# Patient Record
Sex: MALE | Race: BLACK OR AFRICAN AMERICAN | NOT HISPANIC OR LATINO
[De-identification: names, ages, dates, MRNs, and addresses within clinical notes are randomized per-mention and may not be internally consistent; named-entity substitution may affect disease eponyms.]

---

## 2022-03-01 PROBLEM — Z00.00 ENCOUNTER FOR PREVENTIVE HEALTH EXAMINATION: Status: ACTIVE | Noted: 2022-03-01

## 2022-03-03 ENCOUNTER — APPOINTMENT (OUTPATIENT)
Dept: INTERNAL MEDICINE | Facility: CLINIC | Age: 67
End: 2022-03-03
Payer: MEDICARE

## 2022-03-03 ENCOUNTER — RESULT REVIEW (OUTPATIENT)
Age: 67
End: 2022-03-03

## 2022-03-03 VITALS
DIASTOLIC BLOOD PRESSURE: 82 MMHG | HEIGHT: 70 IN | BODY MASS INDEX: 24.77 KG/M2 | WEIGHT: 173 LBS | OXYGEN SATURATION: 97 % | TEMPERATURE: 97.2 F | SYSTOLIC BLOOD PRESSURE: 122 MMHG | HEART RATE: 96 BPM

## 2022-03-03 DIAGNOSIS — Z12.2 ENCOUNTER FOR SCREENING FOR MALIGNANT NEOPLASM OF RESPIRATORY ORGANS: ICD-10-CM

## 2022-03-03 DIAGNOSIS — Z23 ENCOUNTER FOR IMMUNIZATION: ICD-10-CM

## 2022-03-03 DIAGNOSIS — Z01.00 ENCOUNTER FOR EXAMINATION OF EYES AND VISION W/OUT ABNORMAL FINDINGS: ICD-10-CM

## 2022-03-03 PROCEDURE — 99203 OFFICE O/P NEW LOW 30 MIN: CPT | Mod: 25

## 2022-03-03 PROCEDURE — G0442 ANNUAL ALCOHOL SCREEN 15 MIN: CPT

## 2022-03-03 RX ORDER — LATANOPROST/PF 0.005 %
0.01 DROPS OPHTHALMIC (EYE)
Refills: 0 | Status: ACTIVE | COMMUNITY

## 2022-03-03 NOTE — ASSESSMENT
[FreeTextEntry1] : 67 y/o man, new to me, with large L inguinal hernia. Needs general surgery consult ASAP. CT pelvis ordered.\par Dicussed lung cancer screening but would like to defer order for now.\par Defers vaccines (flu, PNA).\par Defers CPE-will likely need preop.\par While patient does consume more than RDD, he doesn't appear to abuse EtOH. 
no

## 2022-03-03 NOTE — PHYSICAL EXAM
[No Edema] : there was no peripheral edema [Normal] : normal gait, coordination grossly intact, no focal deficits and deep tendon reflexes were 2+ and symmetric [Normal Affect] : the affect was normal [Alert and Oriented x3] : oriented to person, place, and time [de-identified] : large non-reducible inguinal mass

## 2022-03-03 NOTE — REVIEW OF SYSTEMS
[Abdominal Pain] : no abdominal pain [Nausea] : no nausea [Constipation] : no constipation [Diarrhea] : no diarrhea [Vomiting] : no vomiting [Heartburn] : no heartburn [Melena] : no melena [Negative] : Heme/Lymph [FreeTextEntry7] : large inguinal mass

## 2022-03-03 NOTE — HEALTH RISK ASSESSMENT
[Good] : ~his/her~  mood as  good [Current] : Current [20 or more] : 20 or more [Yes] : Yes [4 or more  times a week (4 pts)] : 4 or more  times a week (4 points) [1 or 2 (0 pts)] : 1 or 2 (0 points) [Never (0 pts)] : Never (0 points) [No] : In the past 12 months have you used drugs other than those required for medical reasons? No [No falls in past year] : Patient reported no falls in the past year [0] : 2) Feeling down, depressed, or hopeless: Not at all (0) [PHQ-2 Negative - No further assessment needed] : PHQ-2 Negative - No further assessment needed [Audit-CScore] : 4 [[id number]] : 0

## 2022-03-03 NOTE — HISTORY OF PRESENT ILLNESS
[de-identified] : 67 y/o man is here to establish care and "deal with hernia before it deals with me". \par Several years ago, noted L groin pain during workout (preCOVID). Now, it's large and "scary." \par No change in BM, no n/v.\par Always present x 1 year. H/o hernia in the past (umbilical). No pain - just "uncomfortable".\par \par Hasn't been to doctor in "years" (besides ophtho).\par +tobacco dependence-never had lung cancer screen\par ~14 dirnks/week\par \par MODERNA x 3.\par \par Declines dealing with HCM/CPE today but promises to return to discuss vaccines, HCM, labs, EKG, etc (will also likely need preop).

## 2022-03-14 ENCOUNTER — OUTPATIENT (OUTPATIENT)
Dept: OUTPATIENT SERVICES | Facility: HOSPITAL | Age: 67
LOS: 1 days | End: 2022-03-14

## 2022-03-14 ENCOUNTER — APPOINTMENT (OUTPATIENT)
Dept: CT IMAGING | Facility: CLINIC | Age: 67
End: 2022-03-14
Payer: MEDICARE

## 2022-03-14 PROCEDURE — 72193 CT PELVIS W/DYE: CPT | Mod: 26

## 2022-03-20 ENCOUNTER — NON-APPOINTMENT (OUTPATIENT)
Age: 67
End: 2022-03-20

## 2022-03-21 ENCOUNTER — APPOINTMENT (OUTPATIENT)
Dept: SURGERY | Facility: CLINIC | Age: 67
End: 2022-03-21
Payer: MEDICARE

## 2022-03-21 ENCOUNTER — TRANSCRIPTION ENCOUNTER (OUTPATIENT)
Age: 67
End: 2022-03-21

## 2022-03-21 ENCOUNTER — APPOINTMENT (OUTPATIENT)
Dept: UROLOGY | Facility: CLINIC | Age: 67
End: 2022-03-21
Payer: MEDICARE

## 2022-03-21 VITALS
TEMPERATURE: 97.8 F | HEART RATE: 80 BPM | DIASTOLIC BLOOD PRESSURE: 79 MMHG | HEIGHT: 70 IN | BODY MASS INDEX: 25.2 KG/M2 | SYSTOLIC BLOOD PRESSURE: 153 MMHG | WEIGHT: 176 LBS

## 2022-03-21 VITALS
TEMPERATURE: 97.3 F | SYSTOLIC BLOOD PRESSURE: 159 MMHG | HEART RATE: 80 BPM | HEIGHT: 70 IN | WEIGHT: 178.38 LBS | BODY MASS INDEX: 25.54 KG/M2 | DIASTOLIC BLOOD PRESSURE: 76 MMHG | OXYGEN SATURATION: 97 %

## 2022-03-21 PROCEDURE — 51798 US URINE CAPACITY MEASURE: CPT

## 2022-03-21 PROCEDURE — 99204 OFFICE O/P NEW MOD 45 MIN: CPT | Mod: 25

## 2022-03-21 PROCEDURE — 99204 OFFICE O/P NEW MOD 45 MIN: CPT

## 2022-03-21 NOTE — PHYSICAL EXAM
[General Appearance - Well Developed] : well developed [General Appearance - Well Nourished] : well nourished [Normal Appearance] : normal appearance [Well Groomed] : well groomed [General Appearance - In No Acute Distress] : no acute distress [Edema] : no peripheral edema [Respiration, Rhythm And Depth] : normal respiratory rhythm and effort [Exaggerated Use Of Accessory Muscles For Inspiration] : no accessory muscle use [Abdomen Soft] : soft [Abdomen Tenderness] : non-tender [Urethral Meatus] : meatus normal [Urinary Bladder Findings] : the bladder was normal on palpation [Testes Mass (___cm)] : there were no testicular masses [Scrotum] : the scrotum was normal [No Prostate Nodules] : no prostate nodules [Normal Station and Gait] : the gait and station were normal for the patient's age [] : no rash [No Focal Deficits] : no focal deficits [Oriented To Time, Place, And Person] : oriented to person, place, and time [Affect] : the affect was normal [Mood] : the mood was normal [Not Anxious] : not anxious [No Palpable Adenopathy] : no palpable adenopathy [FreeTextEntry1] : large L inguinal hernia, prostate enlarged on MIKEY

## 2022-03-21 NOTE — CONSULT LETTER
[FreeTextEntry1] : 2022\par \par \par \par Ashley Bah M.D.\par [facility] at [address] \par [address]\[place] [address]\par Telephone #:  [phone]\par \par \par Re: Isaac Brooks\par : 1955\par \par \par Dear Dr. Bah:\par \par I had the opportunity to see Mr. Brooks today for evaluation and management of a left inguinal hernia.  He stated the hernia has been present for approximately 3 years.  He first noticed the hernia as a bulge.  He denied significant pain of the hernia, although it is uncomfortable.  He stated the hernia is enlarging over the last year.  He wears a truss with relief of the bulge.\par \par On physical examination, his height is 5 feet 10 inches, his weight is 178 pounds, and BMI is 25.59. His temperature is 97.3 °F, his blood pressure is 159/76, heart rate is 80, and O2 saturation is 97% on room air.  In general, he is a well-dressed, well-nourished man who appears his stated age and is in no acute distress.  He is calm, alert and oriented x 3.  HEENT exam demonstrates no scleral icterus and a normocephalic atraumatic appearance.  His abdomen has audible bowel sounds, is soft, non-tender, and non-distended.  There is no hepatosplenomegaly.  There is a small recurrent umbilical hernia that is reducible and non-tender.  His extremities are warm and dry with no evidence of clubbing, cyanosis, or edema.  Bilateral groin examination demonstrated bilateral reducible inguinal hernias, left much larger than right.\par \par I reviewed the images and report of the CT pelvis that was performed on 2022, which demonstrated very severe prostatic enlargement.  Moderate size left inguinal hernia containing non-obstructed small bowel.\par \par In summary, Mr. Brooks is a 66-year-old man with bilateral inguinal hernias and a recurrent umbilical hernia.  We will plan for a robotic-assisted bilateral inguinal hernia repair with mesh and an umbilical hernia repair with possible at the patient's convenience.  His severely enlarged prostate should be addressed prior to the hernia repair to attempt to avoid urinary retention post-operatively.\par \par Thank you for the opportunity to care for this patient.  Please do not hesitate to contact me in the event that you have any questions or concerns regarding the care of this patient. \par \par Sincerely,\par \par \par \par \par Katy Montgomery M.D.

## 2022-03-21 NOTE — HISTORY OF PRESENT ILLNESS
[Urinary Frequency] : urinary frequency [Nocturia] : nocturia [Weak Stream] : weak stream [FreeTextEntry1] : Mr. BRO is a 66 year old M with PMHx of glaucoma presenting for enlarged prostate.\par Patient was recently being evaluated for large inguinal hernia. \par He underwent CT pelvis 3/14/2022 which demonstrates markedly large prostate with impingement on posterior wall of bladder. \par Today the patient reports feeling well. \par He does have mild urinary frequency, nocturia x1, weak stream.\par  These symptoms are not bothersome to him. \par Denies dyuria, hematuria. \par \par Social history; 50 pack year smoker, 2 beers nightly \par Family history: unknown\par Medications: denies. \par Allergies: NKA\par \par PSA trends\par Last PSA 10+ year ago, elevated. \par He did have a prior prostate biopsy 10 + years ago which he reports was neg.

## 2022-03-21 NOTE — DATA REVIEWED
[FreeTextEntry1] : CT pelvis (3/14/2022) - very severe prostatic enlargement.  Moderate size left inguinal hernia containing non-obstructed small bowel.

## 2022-03-21 NOTE — PHYSICAL EXAM
[Calm] : calm [de-identified] : NAD, comfortable [de-identified] : NCAT, no scleral icterus [de-identified] : +BS soft NT ND.  No hepatosplenomegaly. [de-identified] : Bilateral reducible inguinal hernias, L > R. [de-identified] : No clubbing, cyanosis, or edema. [de-identified] : Warm, dry. [de-identified] : A&Ox3

## 2022-03-21 NOTE — HISTORY OF PRESENT ILLNESS
[de-identified] : Mr. Brooks presented today for evaluation and management of a left inguinal hernia.  He stated the hernia has been present for approximately 3 years.  He first noticed the hernia as a bulge.  He denied significant pain of the hernia, although it is uncomfortable.  He stated the hernia is enlarging over the last year.  He wears a truss with relief of the bulge.

## 2022-03-21 NOTE — END OF VISIT
[FreeTextEntry3] : I, Dr. Arauz, personally performed the evaluation and management (E/M) services for this new patient who presents today with (a) new problem(s)/exacerbation of (an) existing condition(s).  That E/M includes conducting the examination, assessing all new/exacerbated conditions, and establishing a new plan of care.  Today, my ACP, Franca Wild, was here to observe my evaluation and management services for this new problem/exacerbated condition to be followed going forward.\par

## 2022-03-21 NOTE — ASSESSMENT
[FreeTextEntry1] : 67 y/o M with inguinal hernia, significantly enlarged prostate, LUTS. \par \par BPH/LUTS\par Reviewed imaging, very enlarged prostate, likely > 200gm\par discussed impact of prostate size on urinary symptoms. \par Luts not bothersome at this time.\par PVR = 0cc\par \par Discussed importance of prostate cancer screening.\par Enlarged prostate on MIKEY today, neg for nodules. \par History of elevated PSA with negative biopsy. PSA today. Will likely be elevated given size of prostate on CT. \par MRI prostate discussed/ordered for further evaluation. \par \par Inguinal hernia \par Cont. f/u with general surgery \par Okay to proceed with procedure if MRI normal. \par

## 2022-03-21 NOTE — ASSESSMENT
[FreeTextEntry1] : Mr. Brooks is a 66-year-old man with bilateral inguinal hernias and a recurrent umbilical hernia.  We will plan for a robotic-assisted bilateral inguinal hernia repair with mesh and an umbilical hernia repair with possible at the patient's convenience.  His severely enlarged prostate should be addressed prior to the hernia repair to attempt to avoid urinary retention post-operatively.

## 2022-03-22 LAB — PSA SERPL-MCNC: 12.3 NG/ML

## 2022-03-23 ENCOUNTER — TRANSCRIPTION ENCOUNTER (OUTPATIENT)
Age: 67
End: 2022-03-23

## 2022-04-15 NOTE — REVIEW OF SYSTEMS
----- Message from Fredy Reddy MD sent at 4/14/2022 11:43 PM CDT -----  His kidney function is low, but improved over last time.  His blood sugar is borderline high. Avoid concentrated sweets. Other labs were ok.    [Negative] : Heme/Lymph

## 2022-04-18 ENCOUNTER — APPOINTMENT (OUTPATIENT)
Dept: MRI IMAGING | Facility: HOSPITAL | Age: 67
End: 2022-04-18

## 2022-04-18 ENCOUNTER — OUTPATIENT (OUTPATIENT)
Dept: OUTPATIENT SERVICES | Facility: HOSPITAL | Age: 67
LOS: 1 days | End: 2022-04-18
Payer: MEDICARE

## 2022-04-18 PROCEDURE — A9585: CPT

## 2022-04-18 PROCEDURE — 72197 MRI PELVIS W/O & W/DYE: CPT

## 2022-04-18 PROCEDURE — 72197 MRI PELVIS W/O & W/DYE: CPT | Mod: 26

## 2022-04-21 ENCOUNTER — NON-APPOINTMENT (OUTPATIENT)
Age: 67
End: 2022-04-21

## 2022-05-09 ENCOUNTER — APPOINTMENT (OUTPATIENT)
Dept: SURGERY | Facility: CLINIC | Age: 67
End: 2022-05-09
Payer: MEDICARE

## 2022-05-09 VITALS
BODY MASS INDEX: 25.05 KG/M2 | DIASTOLIC BLOOD PRESSURE: 83 MMHG | HEIGHT: 70 IN | HEART RATE: 88 BPM | SYSTOLIC BLOOD PRESSURE: 150 MMHG | OXYGEN SATURATION: 98 % | TEMPERATURE: 96.7 F | WEIGHT: 175 LBS

## 2022-05-09 PROCEDURE — 99213 OFFICE O/P EST LOW 20 MIN: CPT

## 2022-05-09 NOTE — PHYSICAL EXAM
[Calm] : calm [de-identified] : NAD, comfortable [de-identified] : NCAT, no scleral icterus [de-identified] : +BS soft NT ND.  No hepatosplenomegaly.  Recurrent umbilical hernia, reducible and non-tender. [de-identified] : Bilateral reducible inguinal hernias, L > R. [de-identified] : No clubbing, cyanosis, or edema. [de-identified] : Warm, dry. [de-identified] : A&Ox3

## 2022-05-09 NOTE — HISTORY OF PRESENT ILLNESS
[de-identified] : Mr. Brooks presented previously for evaluation and management of a left inguinal hernia.  He stated the hernia has been present for approximately 3 years.  He first noticed the hernia as a bulge.  He denied significant pain of the hernia, although it is uncomfortable.  He stated the hernia is enlarging over the last year.  He wears a truss with relief of the bulge. [de-identified] : He presented today for follow up.  He was seen by urology and "cleared" for hernia repair.  He stated the hernia has continued to enlarge since his last visit.  He noted nocturia 2-3 times a night currently.

## 2022-05-09 NOTE — ASSESSMENT
[FreeTextEntry1] : Mr. Brooks is a 66-year-old man with bilateral inguinal hernias and a recurrent umbilical hernia.  We will plan for a robotic-assisted bilateral inguinal hernia repair with mesh and a recurrent umbilical hernia repair with mesh on July 19, 2022.  He must quit smoking 8 weeks prior to surgery, and will need urine cotinine testing 2 weeks prior to the scheduled surgery date.  I will discuss with urology his severely enlarged prostate and whether this should be addressed prior to the hernia repair to attempt to avoid urinary retention post-operatively.

## 2022-07-05 LAB
ANABASINE UR-MCNC: <2 NG/ML
COTININE UR-MCNC: <5 NG/ML
COTININE UR-MCNC: <5 NG/ML
NORNICOTINE UR-MCNC: <2 NG/ML

## 2022-07-07 ENCOUNTER — OUTPATIENT (OUTPATIENT)
Dept: OUTPATIENT SERVICES | Facility: HOSPITAL | Age: 67
LOS: 1 days | End: 2022-07-07
Payer: MEDICARE

## 2022-07-07 ENCOUNTER — NON-APPOINTMENT (OUTPATIENT)
Age: 67
End: 2022-07-07

## 2022-07-07 ENCOUNTER — APPOINTMENT (OUTPATIENT)
Dept: INTERNAL MEDICINE | Facility: CLINIC | Age: 67
End: 2022-07-07

## 2022-07-07 ENCOUNTER — RESULT REVIEW (OUTPATIENT)
Age: 67
End: 2022-07-07

## 2022-07-07 VITALS
OXYGEN SATURATION: 97 % | TEMPERATURE: 97.3 F | HEIGHT: 70 IN | WEIGHT: 179 LBS | BODY MASS INDEX: 25.62 KG/M2 | DIASTOLIC BLOOD PRESSURE: 86 MMHG | HEART RATE: 79 BPM | SYSTOLIC BLOOD PRESSURE: 140 MMHG

## 2022-07-07 PROCEDURE — 71046 X-RAY EXAM CHEST 2 VIEWS: CPT

## 2022-07-07 PROCEDURE — 93000 ELECTROCARDIOGRAM COMPLETE: CPT

## 2022-07-07 PROCEDURE — 71046 X-RAY EXAM CHEST 2 VIEWS: CPT | Mod: 26

## 2022-07-07 PROCEDURE — 99214 OFFICE O/P EST MOD 30 MIN: CPT | Mod: 25

## 2022-07-07 PROCEDURE — 36415 COLL VENOUS BLD VENIPUNCTURE: CPT

## 2022-07-07 RX ORDER — DORZOLAMIDE HYDROCHLORIDE TIMOLOL MALEATE 20; 5 MG/ML; MG/ML
22.3-6.8 SOLUTION/ DROPS OPHTHALMIC
Qty: 10 | Refills: 0 | Status: ACTIVE | COMMUNITY
Start: 2022-06-15

## 2022-07-07 NOTE — PHYSICAL EXAM
[No Acute Distress] : no acute distress [Well Nourished] : well nourished [Well Developed] : well developed [Well-Appearing] : well-appearing [Normal Sclera/Conjunctiva] : normal sclera/conjunctiva [PERRL] : pupils equal round and reactive to light [EOMI] : extraocular movements intact [Normal Outer Ear/Nose] : the outer ears and nose were normal in appearance [Normal Oropharynx] : the oropharynx was normal [No JVD] : no jugular venous distention [No Lymphadenopathy] : no lymphadenopathy [Supple] : supple [Thyroid Normal, No Nodules] : the thyroid was normal and there were no nodules present [No Respiratory Distress] : no respiratory distress  [No Accessory Muscle Use] : no accessory muscle use [Clear to Auscultation] : lungs were clear to auscultation bilaterally [Normal Rate] : normal rate  [Regular Rhythm] : with a regular rhythm [Normal S1, S2] : normal S1 and S2 [No Murmur] : no murmur heard [No Carotid Bruits] : no carotid bruits [No Abdominal Bruit] : a ~M bruit was not heard ~T in the abdomen [No Varicosities] : no varicosities [Pedal Pulses Present] : the pedal pulses are present [No Edema] : there was no peripheral edema [No Palpable Aorta] : no palpable aorta [No Extremity Clubbing/Cyanosis] : no extremity clubbing/cyanosis [Soft] : abdomen soft [Non Tender] : non-tender [Non-distended] : non-distended [No Masses] : no abdominal mass palpated [No HSM] : no HSM [Normal Bowel Sounds] : normal bowel sounds [Normal Posterior Cervical Nodes] : no posterior cervical lymphadenopathy [Normal Anterior Cervical Nodes] : no anterior cervical lymphadenopathy [No CVA Tenderness] : no CVA  tenderness [No Spinal Tenderness] : no spinal tenderness [No Joint Swelling] : no joint swelling [Grossly Normal Strength/Tone] : grossly normal strength/tone [No Rash] : no rash [Coordination Grossly Intact] : coordination grossly intact [No Focal Deficits] : no focal deficits [Normal Gait] : normal gait [Deep Tendon Reflexes (DTR)] : deep tendon reflexes were 2+ and symmetric [Normal Affect] : the affect was normal [Normal Insight/Judgement] : insight and judgment were intact [Alert and Oriented x3] : oriented to person, place, and time [de-identified] : hernia present

## 2022-07-07 NOTE — ASSESSMENT
[Patient Optimized for Surgery] : Patient optimized for surgery [No Further Testing Recommended] : no further testing recommended [Continue medications as is] : Continue current medications [As per surgery] : as per surgery [FreeTextEntry4] : 66 y/o man presents for preoperative clearance prior to hernia repair.\par Pending normal labs, he will be cleared to proceed.

## 2022-07-07 NOTE — HISTORY OF PRESENT ILLNESS
[No Pertinent Cardiac History] : no history of aortic stenosis, atrial fibrillation, coronary artery disease, recent myocardial infarction, or implantable device/pacemaker [No Pertinent Pulmonary History] : no history of asthma, COPD, sleep apnea, or smoking [No Adverse Anesthesia Reaction] : no adverse anesthesia reaction in self or family member [Chronic Anticoagulation] : no chronic anticoagulation [Chronic Kidney Disease] : no chronic kidney disease [Diabetes] : no diabetes [FreeTextEntry1] : robotic hernia repair [FreeTextEntry2] : 07/19/22 [FreeTextEntry3] : Valentina [FreeTextEntry4] : 66 y/o man with inguinal hernia, glaucoma, BPH presents for preoperative clearance prior to hernia repair.\par Pt recently started on Flomax for BPH. \par Pt stopped smoking last month.

## 2022-07-11 LAB
ALBUMIN SERPL ELPH-MCNC: 4.3 G/DL
ALP BLD-CCNC: 55 U/L
ALT SERPL-CCNC: 21 U/L
ANION GAP SERPL CALC-SCNC: 11 MMOL/L
APPEARANCE: CLEAR
APTT BLD: 34.3 SEC
AST SERPL-CCNC: 21 U/L
BACTERIA UR CULT: NORMAL
BASOPHILS # BLD AUTO: 0.04 K/UL
BASOPHILS NFR BLD AUTO: 1 %
BILIRUB SERPL-MCNC: 0.6 MG/DL
BILIRUBIN URINE: NEGATIVE
BLOOD URINE: NEGATIVE
BUN SERPL-MCNC: 13 MG/DL
CALCIUM SERPL-MCNC: 9.3 MG/DL
CHLORIDE SERPL-SCNC: 105 MMOL/L
CO2 SERPL-SCNC: 25 MMOL/L
COLOR: NORMAL
CREAT SERPL-MCNC: 1.01 MG/DL
EGFR: 82 ML/MIN/1.73M2
EOSINOPHIL # BLD AUTO: 0.08 K/UL
EOSINOPHIL NFR BLD AUTO: 2 %
GLUCOSE QUALITATIVE U: NEGATIVE
GLUCOSE SERPL-MCNC: 106 MG/DL
HCT VFR BLD CALC: 43.7 %
HGB BLD-MCNC: 14.2 G/DL
IMM GRANULOCYTES NFR BLD AUTO: 0.2 %
INR PPP: 1.01 RATIO
KETONES URINE: NEGATIVE
LEUKOCYTE ESTERASE URINE: NEGATIVE
LYMPHOCYTES # BLD AUTO: 1.21 K/UL
LYMPHOCYTES NFR BLD AUTO: 30 %
MAN DIFF?: NORMAL
MCHC RBC-ENTMCNC: 31.3 PG
MCHC RBC-ENTMCNC: 32.5 GM/DL
MCV RBC AUTO: 96.3 FL
MONOCYTES # BLD AUTO: 0.4 K/UL
MONOCYTES NFR BLD AUTO: 9.9 %
NEUTROPHILS # BLD AUTO: 2.29 K/UL
NEUTROPHILS NFR BLD AUTO: 56.9 %
NITRITE URINE: NEGATIVE
PH URINE: 6.5
PLATELET # BLD AUTO: 261 K/UL
POTASSIUM SERPL-SCNC: 4.4 MMOL/L
PROT SERPL-MCNC: 6.9 G/DL
PROTEIN URINE: NEGATIVE
PT BLD: 11.8 SEC
RBC # BLD: 4.54 M/UL
RBC # FLD: 11.9 %
SODIUM SERPL-SCNC: 141 MMOL/L
SPECIFIC GRAVITY URINE: 1.01
UROBILINOGEN URINE: NORMAL
WBC # FLD AUTO: 4.03 K/UL

## 2022-07-14 ENCOUNTER — OUTPATIENT (OUTPATIENT)
Dept: OUTPATIENT SERVICES | Facility: HOSPITAL | Age: 67
LOS: 1 days | End: 2022-07-14
Payer: MEDICARE

## 2022-07-14 ENCOUNTER — APPOINTMENT (OUTPATIENT)
Dept: CT IMAGING | Facility: HOSPITAL | Age: 67
End: 2022-07-14

## 2022-07-14 PROCEDURE — 71250 CT THORAX DX C-: CPT | Mod: 26

## 2022-07-14 PROCEDURE — 71250 CT THORAX DX C-: CPT

## 2022-07-18 ENCOUNTER — TRANSCRIPTION ENCOUNTER (OUTPATIENT)
Age: 67
End: 2022-07-18

## 2022-07-18 RX ORDER — CHLORHEXIDINE GLUCONATE 213 G/1000ML
1 SOLUTION TOPICAL DAILY
Refills: 0 | Status: DISCONTINUED | OUTPATIENT
Start: 2022-07-19 | End: 2022-07-19

## 2022-07-18 NOTE — ASU PATIENT PROFILE, ADULT - NSICDXPASTMEDICALHX_GEN_ALL_CORE_FT
PAST MEDICAL HISTORY:  Bilateral inguinal hernia (BIH)     BPH without urinary obstruction     Glaucoma     Umbilical hernia

## 2022-07-18 NOTE — ASU PATIENT PROFILE, ADULT - NSICDXPASTSURGICALHX_GEN_ALL_CORE_FT
PAST SURGICAL HISTORY:  H/O oral surgery     H/O prostate biopsy     History of umbilical hernia repair

## 2022-07-18 NOTE — ASU PATIENT PROFILE, ADULT - VISION (WITH CORRECTIVE LENSES IF THE PATIENT USUALLY WEARS THEM):
glasses home/Partially impaired: cannot see medication labels or newsprint, but can see obstacles in path, and the surrounding layout; can count fingers at arm's length

## 2022-07-19 ENCOUNTER — TRANSCRIPTION ENCOUNTER (OUTPATIENT)
Age: 67
End: 2022-07-19

## 2022-07-19 ENCOUNTER — OUTPATIENT (OUTPATIENT)
Dept: OUTPATIENT SERVICES | Facility: HOSPITAL | Age: 67
LOS: 1 days | Discharge: HOME | End: 2022-07-19

## 2022-07-19 ENCOUNTER — APPOINTMENT (OUTPATIENT)
Dept: SURGERY | Facility: AMBULATORY SURGERY CENTER | Age: 67
End: 2022-07-19

## 2022-07-19 VITALS
HEART RATE: 69 BPM | WEIGHT: 175.71 LBS | SYSTOLIC BLOOD PRESSURE: 145 MMHG | OXYGEN SATURATION: 100 % | TEMPERATURE: 98 F | DIASTOLIC BLOOD PRESSURE: 79 MMHG | HEIGHT: 70 IN

## 2022-07-19 VITALS
OXYGEN SATURATION: 100 % | SYSTOLIC BLOOD PRESSURE: 133 MMHG | DIASTOLIC BLOOD PRESSURE: 75 MMHG | HEART RATE: 71 BPM | TEMPERATURE: 99 F | RESPIRATION RATE: 16 BRPM

## 2022-07-19 DIAGNOSIS — Z98.890 OTHER SPECIFIED POSTPROCEDURAL STATES: Chronic | ICD-10-CM

## 2022-07-19 LAB — SARS-COV-2 N GENE NPH QL NAA+PROBE: NOT DETECTED

## 2022-07-19 PROCEDURE — 49650 LAP ING HERNIA REPAIR INIT: CPT | Mod: GC,LT

## 2022-07-19 PROCEDURE — 49650 LAP ING HERNIA REPAIR INIT: CPT | Mod: LT

## 2022-07-19 PROCEDURE — 49585: CPT | Mod: GC

## 2022-07-19 PROCEDURE — 49585: CPT

## 2022-07-19 PROCEDURE — S2900 ROBOTIC SURGICAL SYSTEM: CPT | Mod: NC

## 2022-07-19 PROCEDURE — S2900 ROBOTIC SURGICAL SYSTEM: CPT | Mod: NC,GC

## 2022-07-19 DEVICE — MESH HERNIA INGUINAL 3DMAX EXTRA LARGE 12.4 X 17.3CM RIGHT: Type: IMPLANTABLE DEVICE | Site: BILATERAL | Status: FUNCTIONAL

## 2022-07-19 DEVICE — MESH HERNIA INGUINAL 3DMAX EXTRA LARGE 5 X 7" LEFT: Type: IMPLANTABLE DEVICE | Site: BILATERAL | Status: FUNCTIONAL

## 2022-07-19 RX ORDER — SODIUM CHLORIDE 9 MG/ML
1000 INJECTION, SOLUTION INTRAVENOUS
Refills: 0 | Status: DISCONTINUED | OUTPATIENT
Start: 2022-07-19 | End: 2022-07-19

## 2022-07-19 RX ORDER — ACETAMINOPHEN 500 MG
1000 TABLET ORAL ONCE
Refills: 0 | Status: COMPLETED | OUTPATIENT
Start: 2022-07-19 | End: 2022-07-19

## 2022-07-19 RX ORDER — OXYCODONE HYDROCHLORIDE 5 MG/1
1 TABLET ORAL
Qty: 5 | Refills: 0
Start: 2022-07-19

## 2022-07-19 RX ORDER — ACETAMINOPHEN 500 MG
650 TABLET ORAL EVERY 6 HOURS
Refills: 0 | Status: DISCONTINUED | OUTPATIENT
Start: 2022-07-19 | End: 2022-07-19

## 2022-07-19 RX ORDER — DOCUSATE SODIUM 100 MG
1 CAPSULE ORAL
Qty: 20 | Refills: 0
Start: 2022-07-19

## 2022-07-19 RX ORDER — TAMSULOSIN HYDROCHLORIDE 0.4 MG/1
0.4 CAPSULE ORAL ONCE
Refills: 0 | Status: COMPLETED | OUTPATIENT
Start: 2022-07-19 | End: 2022-07-19

## 2022-07-19 RX ORDER — FENTANYL CITRATE 50 UG/ML
25 INJECTION INTRAVENOUS
Refills: 0 | Status: DISCONTINUED | OUTPATIENT
Start: 2022-07-19 | End: 2022-07-19

## 2022-07-19 RX ORDER — ONDANSETRON 8 MG/1
4 TABLET, FILM COATED ORAL ONCE
Refills: 0 | Status: DISCONTINUED | OUTPATIENT
Start: 2022-07-19 | End: 2022-07-19

## 2022-07-19 RX ADMIN — TAMSULOSIN HYDROCHLORIDE 0.4 MILLIGRAM(S): 0.4 CAPSULE ORAL at 14:48

## 2022-07-19 RX ADMIN — CHLORHEXIDINE GLUCONATE 1 APPLICATION(S): 213 SOLUTION TOPICAL at 09:44

## 2022-07-19 RX ADMIN — Medication 1000 MILLIGRAM(S): at 09:44

## 2022-07-19 NOTE — ASU DISCHARGE PLAN (ADULT/PEDIATRIC) - ASU DC SPECIAL INSTRUCTIONSFT
-Bed rest for 4 days  -Ice packs ALL the time  -At least 2L of water in 24 hrs  -2 extra strength tylenol + 1 advil + 3 tablets at the same time EVERY 6 hours regardless if you have pain or not for 4 days, after as needed  -Liquid diet until bowel movement  -Oxycodone at bed time if needed

## 2022-07-19 NOTE — BRIEF OPERATIVE NOTE - OPERATION/FINDINGS
robotic assisted bilateral inguinal hernia repair; preperitoneal space bilaterally dissected with monopolar scissor, round ligament identified, hernias reduced, hernias repaired bilaterally with progrip mesh x 2; preperitoneal space closed bilaterally with quill suture. hemostasis achieved. skin and fascia closed in the usual fashion

## 2022-07-19 NOTE — ASU DISCHARGE PLAN (ADULT/PEDIATRIC) - NS MD DC FALL RISK RISK
For information on Fall & Injury Prevention, visit: [email or web address] OR  [email or web address] OR  https://www.cdc.gov/steadi/patient.html

## 2022-07-19 NOTE — BRIEF OPERATIVE NOTE - NSICDXBRIEFPROCEDURE_GEN_ALL_CORE_FT
PROCEDURES:  Robot-assisted repair of inguinal hernia 19-Jul-2022 13:09:49  Alonso Pavon  Repair, hernia, umbilical, adult 19-Jul-2022 13:10:01  Alonso Pavon

## 2022-07-20 ENCOUNTER — NON-APPOINTMENT (OUTPATIENT)
Age: 67
End: 2022-07-20

## 2022-07-20 ENCOUNTER — APPOINTMENT (OUTPATIENT)
Dept: UROLOGY | Facility: CLINIC | Age: 67
End: 2022-07-20

## 2022-07-20 PROBLEM — N40.0 BENIGN PROSTATIC HYPERPLASIA WITHOUT LOWER URINARY TRACT SYMPTOMS: Chronic | Status: ACTIVE | Noted: 2022-07-19

## 2022-07-20 PROBLEM — K42.9 UMBILICAL HERNIA WITHOUT OBSTRUCTION OR GANGRENE: Chronic | Status: ACTIVE | Noted: 2022-07-19

## 2022-07-20 PROBLEM — K40.20 BILATERAL INGUINAL HERNIA, WITHOUT OBSTRUCTION OR GANGRENE, NOT SPECIFIED AS RECURRENT: Chronic | Status: ACTIVE | Noted: 2022-07-19

## 2022-07-20 PROBLEM — H40.9 UNSPECIFIED GLAUCOMA: Chronic | Status: ACTIVE | Noted: 2022-07-19

## 2022-07-22 ENCOUNTER — APPOINTMENT (OUTPATIENT)
Dept: UROLOGY | Facility: CLINIC | Age: 67
End: 2022-07-22

## 2022-07-22 VITALS — HEART RATE: 69 BPM | DIASTOLIC BLOOD PRESSURE: 78 MMHG | TEMPERATURE: 98.2 F | SYSTOLIC BLOOD PRESSURE: 144 MMHG

## 2022-07-22 PROCEDURE — A4358: CPT | Mod: NC

## 2022-07-22 PROCEDURE — 99212 OFFICE O/P EST SF 10 MIN: CPT | Mod: 25

## 2022-07-22 NOTE — ASSESSMENT
[FreeTextEntry1] : 65 y/o M with significantly enlarged prostate, LUTS, recent inguinal hernia repair\par Very enlarged prostate, likely > 200gm on imaging. \par PSA 3/12/22 - 12.3, MRI 4/22 showed no suspicious lesions.\par Voiding symptoms are not bothersome at baseline. \par \par Now with  post-op urinary retention. \par Started on Tamsulosin. \par Catheter adjusted today, bag changed. \par Reassured\par F/u next week for TOV.\par \par \par \par \par

## 2022-07-22 NOTE — PHYSICAL EXAM
[General Appearance - Well Developed] : well developed [General Appearance - Well Nourished] : well nourished [Normal Appearance] : normal appearance [Well Groomed] : well groomed [General Appearance - In No Acute Distress] : no acute distress [Edema] : no peripheral edema [] : no respiratory distress [Respiration, Rhythm And Depth] : normal respiratory rhythm and effort [Exaggerated Use Of Accessory Muscles For Inspiration] : no accessory muscle use [Oriented To Time, Place, And Person] : oriented to person, place, and time [Affect] : the affect was normal [Mood] : the mood was normal [Not Anxious] : not anxious [FreeTextEntry1] : lynne catheter intact. dark yellow/light ambe urine in bag

## 2022-07-22 NOTE — HISTORY OF PRESENT ILLNESS
[Nocturia] : nocturia [FreeTextEntry1] : Mr. BRO is a 66 year old M with PMHx of glaucoma presenting for enlarged prostate.\par Patient was recently being evaluated for large inguinal hernia. \par He underwent CT pelvis 3/14/2022 which demonstrates markedly large prostate with impingement on posterior wall of bladder. \par Today the patient reports feeling well. \par He does have mild urinary frequency, nocturia x1, weak stream.\par  These symptoms are not bothersome to him. \par Denies dyuria, hematuria. \par \par Social history; 50 pack year smoker, 2 beers nightly \par Family history: unknown\par Medications: denies. \par Allergies: NKA\par \par PSA trends\par Last PSA 10+ year ago, elevated. \par He did have a prior prostate biopsy 10 + years ago which he reports was neg. \par \par 7/22/22 Seen today for Lagunas catheter issues. He underwent hernia repair three days and developed urinary retention post-op. He reports catheter is now leaking around meatus, and he has associated mild irritation at the site. He is otherwise feeling well. Denies fevers, chills.

## 2022-07-25 ENCOUNTER — APPOINTMENT (OUTPATIENT)
Dept: UROLOGY | Facility: CLINIC | Age: 67
End: 2022-07-25

## 2022-07-25 VITALS — HEART RATE: 73 BPM | DIASTOLIC BLOOD PRESSURE: 81 MMHG | SYSTOLIC BLOOD PRESSURE: 143 MMHG | TEMPERATURE: 98 F

## 2022-07-25 PROCEDURE — 99213 OFFICE O/P EST LOW 20 MIN: CPT | Mod: 25

## 2022-07-25 PROCEDURE — 51798 US URINE CAPACITY MEASURE: CPT

## 2022-07-25 NOTE — ASSESSMENT
[FreeTextEntry1] : 66 y/o M with significantly enlarged prostate, LUTS, recent inguinal hernia repair\par Very enlarged prostate, likely > 200gm on imaging. \par PSA 3/12/22 - 12.3, MRI 4/22 showed no suspicious lesions.\par Now with  post-op urinary retention. \par Continue Tamsulosin. \par Passed TOV today\par \par F/u 6 weeks\par \par \par \par \par

## 2022-07-25 NOTE — HISTORY OF PRESENT ILLNESS
[FreeTextEntry1] : Mr. BRO is a 66 year old M with PMHx of glaucoma presenting for enlarged prostate.\par Patient was recently being evaluated for large inguinal hernia. \par He underwent CT pelvis 3/14/2022 which demonstrates markedly large prostate with impingement on posterior wall of bladder. \par Today the patient reports feeling well. \par He does have mild urinary frequency, nocturia x1, weak stream.\par  These symptoms are not bothersome to him. \par Denies dyuria, hematuria. \par \par Social history; 50 pack year smoker, 2 beers nightly \par Family history: unknown\par Medications: denies. \par Allergies: NKA\par \par PSA trends\par Last PSA 10+ year ago, elevated. \par He did have a prior prostate biopsy 10 + years ago which he reports was neg. \par \par 7/22/22 Seen today for Lagunas catheter issues. He underwent hernia repair three days and developed urinary retention post-op. He reports catheter is now leaking around meatus, and he has associated mild irritation at the site. He is otherwise feeling well. Denies fevers, chills.   \par \par 7/25/22 Here for TOV after hernia surgery. Taking tamsulosin daily.  [Urinary Retention] : urinary retention

## 2022-08-01 ENCOUNTER — APPOINTMENT (OUTPATIENT)
Dept: SURGERY | Facility: CLINIC | Age: 67
End: 2022-08-01

## 2022-08-01 VITALS
HEART RATE: 77 BPM | HEIGHT: 70 IN | SYSTOLIC BLOOD PRESSURE: 127 MMHG | WEIGHT: 170.5 LBS | BODY MASS INDEX: 24.41 KG/M2 | DIASTOLIC BLOOD PRESSURE: 79 MMHG | OXYGEN SATURATION: 98 % | TEMPERATURE: 95.5 F

## 2022-08-01 DIAGNOSIS — Z87.891 PERSONAL HISTORY OF NICOTINE DEPENDENCE: ICD-10-CM

## 2022-08-01 DIAGNOSIS — Z82.49 FAMILY HISTORY OF ISCHEMIC HEART DISEASE AND OTHER DISEASES OF THE CIRCULATORY SYSTEM: ICD-10-CM

## 2022-08-01 PROCEDURE — 99024 POSTOP FOLLOW-UP VISIT: CPT

## 2022-08-01 NOTE — ASSESSMENT
[FreeTextEntry1] : Mr. Brooks is a 66-year-old man who underwent a robotic-assisted bilateral inguinal hernia repair with mesh and umbilical hernia repair on July 19, 2022.  This was complicated by urinary retention, which required placement of a Lagunas catheter, which was removed on July 25, 2022.  He is recovering as expected and will follow up with me as needed.

## 2022-08-01 NOTE — REASON FOR VISIT
[Post Op: _________] : a [unfilled] post op visit [FreeTextEntry1] : He underwent a robotic-assisted bilateral inguinal hernia repair with mesh and umbilical hernia repair on July 19, 2022.

## 2022-08-01 NOTE — HISTORY OF PRESENT ILLNESS
[de-identified] : Mr. Brooks presented previously for evaluation and management of a left inguinal hernia.  He stated the hernia has been present for approximately 3 years.  He first noticed the hernia as a bulge.  He denied significant pain of the hernia, although it is uncomfortable.  He stated the hernia is enlarging over the last year.  He wears a truss with relief of the bulge.  He was seen by urology and "cleared" for hernia repair.  He stated the hernia has continued to enlarge since his last visit.  He noted nocturia 2-3 times a night currently.  He underwent a robotic-assisted bilateral inguinal hernia repair with mesh and umbilical hernia repair on July 19, 2022.  This was complicated by urinary retention, which required placement of a Lagunas catheter, which was removed on July 25, 2022. [de-identified] : He presented today for a routine post-operative visit.  He is overall feeling well.  He denied fever, chills, nausea, vomiting, diarrhea, or constipation.  The Lagunas catheter was removed

## 2022-09-07 ENCOUNTER — APPOINTMENT (OUTPATIENT)
Dept: UROLOGY | Facility: CLINIC | Age: 67
End: 2022-09-07

## 2022-09-07 VITALS — DIASTOLIC BLOOD PRESSURE: 78 MMHG | SYSTOLIC BLOOD PRESSURE: 133 MMHG | TEMPERATURE: 98.2 F | HEART RATE: 90 BPM

## 2022-09-07 PROCEDURE — 99213 OFFICE O/P EST LOW 20 MIN: CPT | Mod: 25

## 2022-09-07 PROCEDURE — 51798 US URINE CAPACITY MEASURE: CPT

## 2022-09-07 NOTE — ASSESSMENT
[FreeTextEntry1] : 66 y/o M with significantly enlarged prostate, LUTS\par Very enlarged prostate, likely > 200gm on imaging. \par PSA 3/12/22 - 12.3, MRI 4/22 showed no suspicious lesions.\par post-op urinary retention following hernia repair, now resolved\par \par Normal PVR today\par Continue Tamsulosin. \par \par F/u 6 months\par \par \par \par \par \par

## 2022-09-07 NOTE — HISTORY OF PRESENT ILLNESS
[FreeTextEntry1] : Mr. BRO is a 66 year old M with PMHx of glaucoma presenting for enlarged prostate.\par Patient was recently being evaluated for large inguinal hernia. \par He underwent CT pelvis 3/14/2022 which demonstrates markedly large prostate with impingement on posterior wall of bladder. \par Today the patient reports feeling well. \par He does have mild urinary frequency, nocturia x1, weak stream.\par  These symptoms are not bothersome to him. \par Denies dyuria, hematuria. \par \par Social history; 50 pack year smoker, 2 beers nightly \par Family history: unknown\par Medications: denies. \par Allergies: NKA\par \par PSA trends\par Last PSA 10+ year ago, elevated. \par He did have a prior prostate biopsy 10 + years ago which he reports was neg. \par \par 7/22/22 Seen today for Lagunas catheter issues. He underwent hernia repair three days and developed urinary retention post-op. He reports catheter is now leaking around meatus, and he has associated mild irritation at the site. He is otherwise feeling well. Denies fevers, chills.   \par \par 7/25/22 Here for TOV after hernia surgery. Taking tamsulosin daily. \par \par 9/7/22 Here for f/u. Doing well, voiding well, still taking tamsulosin.  [Urinary Retention] : no urinary retention

## 2022-09-07 NOTE — PHYSICAL EXAM
[General Appearance - Well Developed] : well developed [General Appearance - Well Nourished] : well nourished [Normal Appearance] : normal appearance [Well Groomed] : well groomed [General Appearance - In No Acute Distress] : no acute distress [Edema] : no peripheral edema [] : no respiratory distress [Respiration, Rhythm And Depth] : normal respiratory rhythm and effort [Exaggerated Use Of Accessory Muscles For Inspiration] : no accessory muscle use [Oriented To Time, Place, And Person] : oriented to person, place, and time [Affect] : the affect was normal [Mood] : the mood was normal [Not Anxious] : not anxious [FreeTextEntry1] : PVR = 49cc

## 2023-01-10 ENCOUNTER — RX RENEWAL (OUTPATIENT)
Age: 68
End: 2023-01-10

## 2023-03-06 ENCOUNTER — APPOINTMENT (OUTPATIENT)
Dept: UROLOGY | Facility: CLINIC | Age: 68
End: 2023-03-06
Payer: MEDICARE

## 2023-03-06 VITALS — DIASTOLIC BLOOD PRESSURE: 81 MMHG | TEMPERATURE: 97.7 F | SYSTOLIC BLOOD PRESSURE: 124 MMHG | HEART RATE: 85 BPM

## 2023-03-06 PROCEDURE — 99213 OFFICE O/P EST LOW 20 MIN: CPT

## 2023-03-06 NOTE — ASSESSMENT
[FreeTextEntry1] : 68 y/o M with significantly enlarged prostate, LUTS\par Very enlarged prostate, likely > 200gm on imaging. \par PSA 3/12/22 - 12.3, MRI 4/22 showed no suspicious lesions.\par post-op urinary retention following hernia repair, now resolved\par Doing well\par Check PSA today \par Continue Tamsulosin. \par \par F/u 6 months\par \par \par \par \par \par

## 2023-03-06 NOTE — PHYSICAL EXAM
[General Appearance - Well Developed] : well developed [General Appearance - Well Nourished] : well nourished [Normal Appearance] : normal appearance [Well Groomed] : well groomed [General Appearance - In No Acute Distress] : no acute distress [Edema] : no peripheral edema [] : no respiratory distress [Exaggerated Use Of Accessory Muscles For Inspiration] : no accessory muscle use [Respiration, Rhythm And Depth] : normal respiratory rhythm and effort [Oriented To Time, Place, And Person] : oriented to person, place, and time [Affect] : the affect was normal [Mood] : the mood was normal [Not Anxious] : not anxious

## 2023-03-06 NOTE — HISTORY OF PRESENT ILLNESS
[FreeTextEntry1] : Mr. BRO is a 66 year old M with PMHx of glaucoma presenting for enlarged prostate.\par Patient was recently being evaluated for large inguinal hernia. \par He underwent CT pelvis 3/14/2022 which demonstrates markedly large prostate with impingement on posterior wall of bladder. \par Today the patient reports feeling well. \par He does have mild urinary frequency, nocturia x1, weak stream.\par  These symptoms are not bothersome to him. \par Denies dyuria, hematuria. \par \par Social history; 50 pack year smoker, 2 beers nightly \par Family history: unknown\par Medications: denies. \par Allergies: NKA\par \par PSA trends\par Last PSA 10+ year ago, elevated. \par He did have a prior prostate biopsy 10 + years ago which he reports was neg. \par \par 7/22/22 Seen today for Lagunas catheter issues. He underwent hernia repair three days and developed urinary retention post-op. He reports catheter is now leaking around meatus, and he has associated mild irritation at the site. He is otherwise feeling well. Denies fevers, chills.   \par \par 7/25/22 Here for TOV after hernia surgery. Taking tamsulosin daily. \par \par 9/7/22 Here for f/u. Doing well, voiding well, still taking tamsulosin. \par \par 3/6/23 Here for fu. Doing well, happy on tamsulosin. No complaints.  [Urinary Retention] : no urinary retention [None] : None

## 2023-03-09 ENCOUNTER — NON-APPOINTMENT (OUTPATIENT)
Age: 68
End: 2023-03-09

## 2023-03-09 LAB — PSA SERPL-MCNC: 13.8 NG/ML

## 2023-09-11 ENCOUNTER — APPOINTMENT (OUTPATIENT)
Dept: UROLOGY | Facility: CLINIC | Age: 68
End: 2023-09-11
Payer: MEDICARE

## 2023-09-11 VITALS
HEART RATE: 93 BPM | DIASTOLIC BLOOD PRESSURE: 83 MMHG | TEMPERATURE: 97.6 F | HEIGHT: 70 IN | SYSTOLIC BLOOD PRESSURE: 130 MMHG | BODY MASS INDEX: 24.34 KG/M2 | WEIGHT: 170 LBS

## 2023-09-11 PROCEDURE — 99213 OFFICE O/P EST LOW 20 MIN: CPT

## 2023-09-12 LAB — PSA SERPL-MCNC: 12.1 NG/ML

## 2023-12-04 ENCOUNTER — RX RENEWAL (OUTPATIENT)
Age: 68
End: 2023-12-04

## 2024-03-11 ENCOUNTER — APPOINTMENT (OUTPATIENT)
Dept: UROLOGY | Facility: CLINIC | Age: 69
End: 2024-03-11

## 2024-03-18 ENCOUNTER — INPATIENT (INPATIENT)
Facility: HOSPITAL | Age: 69
LOS: 0 days | Discharge: HOME | DRG: 725 | End: 2024-03-19
Attending: HOSPITALIST | Admitting: STUDENT IN AN ORGANIZED HEALTH CARE EDUCATION/TRAINING PROGRAM
Payer: MEDICARE

## 2024-03-18 VITALS
SYSTOLIC BLOOD PRESSURE: 141 MMHG | OXYGEN SATURATION: 96 % | WEIGHT: 169.98 LBS | DIASTOLIC BLOOD PRESSURE: 67 MMHG | TEMPERATURE: 99 F | RESPIRATION RATE: 18 BRPM | HEIGHT: 70 IN | HEART RATE: 66 BPM

## 2024-03-18 DIAGNOSIS — N40.0 BENIGN PROSTATIC HYPERPLASIA WITHOUT LOWER URINARY TRACT SYMPTOMS: ICD-10-CM

## 2024-03-18 DIAGNOSIS — N17.9 ACUTE KIDNEY FAILURE, UNSPECIFIED: ICD-10-CM

## 2024-03-18 DIAGNOSIS — Z98.890 OTHER SPECIFIED POSTPROCEDURAL STATES: Chronic | ICD-10-CM

## 2024-03-18 DIAGNOSIS — Z29.9 ENCOUNTER FOR PROPHYLACTIC MEASURES, UNSPECIFIED: ICD-10-CM

## 2024-03-18 DIAGNOSIS — H40.9 UNSPECIFIED GLAUCOMA: ICD-10-CM

## 2024-03-18 DIAGNOSIS — R33.8 OTHER RETENTION OF URINE: ICD-10-CM

## 2024-03-18 LAB
ADD ON TEST-SPECIMEN IN LAB: SIGNIFICANT CHANGE UP
ALBUMIN SERPL ELPH-MCNC: 4.1 G/DL — SIGNIFICANT CHANGE UP (ref 3.3–5)
ALP SERPL-CCNC: 55 U/L — SIGNIFICANT CHANGE UP (ref 40–120)
ALT FLD-CCNC: 22 U/L — SIGNIFICANT CHANGE UP (ref 10–45)
ANION GAP SERPL CALC-SCNC: 11 MMOL/L — SIGNIFICANT CHANGE UP (ref 5–17)
ANION GAP SERPL CALC-SCNC: 12 MMOL/L — SIGNIFICANT CHANGE UP (ref 5–17)
APPEARANCE UR: CLEAR — SIGNIFICANT CHANGE UP
AST SERPL-CCNC: 36 U/L — SIGNIFICANT CHANGE UP (ref 10–40)
BACTERIA # UR AUTO: NEGATIVE /HPF — SIGNIFICANT CHANGE UP
BASOPHILS # BLD AUTO: 0.01 K/UL — SIGNIFICANT CHANGE UP (ref 0–0.2)
BASOPHILS NFR BLD AUTO: 0.1 % — SIGNIFICANT CHANGE UP (ref 0–2)
BILIRUB DIRECT SERPL-MCNC: 0.2 MG/DL — SIGNIFICANT CHANGE UP (ref 0–0.3)
BILIRUB INDIRECT FLD-MCNC: 0.5 MG/DL — SIGNIFICANT CHANGE UP (ref 0.2–1)
BILIRUB SERPL-MCNC: 0.7 MG/DL — SIGNIFICANT CHANGE UP (ref 0.2–1.2)
BILIRUB UR-MCNC: NEGATIVE — SIGNIFICANT CHANGE UP
BUN SERPL-MCNC: 53 MG/DL — HIGH (ref 7–23)
BUN SERPL-MCNC: 57 MG/DL — HIGH (ref 7–23)
CALCIUM SERPL-MCNC: 10.1 MG/DL — SIGNIFICANT CHANGE UP (ref 8.4–10.5)
CALCIUM SERPL-MCNC: 9.3 MG/DL — SIGNIFICANT CHANGE UP (ref 8.4–10.5)
CAST: 2 /LPF — SIGNIFICANT CHANGE UP (ref 0–4)
CHLORIDE SERPL-SCNC: 107 MMOL/L — SIGNIFICANT CHANGE UP (ref 96–108)
CHLORIDE SERPL-SCNC: 108 MMOL/L — SIGNIFICANT CHANGE UP (ref 96–108)
CK SERPL-CCNC: 545 U/L — HIGH (ref 30–200)
CO2 SERPL-SCNC: 25 MMOL/L — SIGNIFICANT CHANGE UP (ref 22–31)
CO2 SERPL-SCNC: 25 MMOL/L — SIGNIFICANT CHANGE UP (ref 22–31)
COLOR SPEC: SIGNIFICANT CHANGE UP
CREAT SERPL-MCNC: 3.98 MG/DL — HIGH (ref 0.5–1.3)
CREAT SERPL-MCNC: 5.36 MG/DL — HIGH (ref 0.5–1.3)
DIFF PNL FLD: ABNORMAL
EGFR: 11 ML/MIN/1.73M2 — LOW
EGFR: 16 ML/MIN/1.73M2 — LOW
EOSINOPHIL # BLD AUTO: 0 K/UL — SIGNIFICANT CHANGE UP (ref 0–0.5)
EOSINOPHIL NFR BLD AUTO: 0 % — SIGNIFICANT CHANGE UP (ref 0–6)
GLUCOSE SERPL-MCNC: 110 MG/DL — HIGH (ref 70–99)
GLUCOSE SERPL-MCNC: 143 MG/DL — HIGH (ref 70–99)
GLUCOSE UR QL: 100 MG/DL
HCT VFR BLD CALC: 42.2 % — SIGNIFICANT CHANGE UP (ref 39–50)
HGB BLD-MCNC: 13.8 G/DL — SIGNIFICANT CHANGE UP (ref 13–17)
IMM GRANULOCYTES NFR BLD AUTO: 0.3 % — SIGNIFICANT CHANGE UP (ref 0–0.9)
KETONES UR-MCNC: ABNORMAL MG/DL
LEUKOCYTE ESTERASE UR-ACNC: ABNORMAL
LYMPHOCYTES # BLD AUTO: 0.6 K/UL — LOW (ref 1–3.3)
LYMPHOCYTES # BLD AUTO: 5.9 % — LOW (ref 13–44)
MAGNESIUM SERPL-MCNC: 2.3 MG/DL — SIGNIFICANT CHANGE UP (ref 1.6–2.6)
MCHC RBC-ENTMCNC: 31.6 PG — SIGNIFICANT CHANGE UP (ref 27–34)
MCHC RBC-ENTMCNC: 32.7 GM/DL — SIGNIFICANT CHANGE UP (ref 32–36)
MCV RBC AUTO: 96.6 FL — SIGNIFICANT CHANGE UP (ref 80–100)
MONOCYTES # BLD AUTO: 1.2 K/UL — HIGH (ref 0–0.9)
MONOCYTES NFR BLD AUTO: 11.7 % — SIGNIFICANT CHANGE UP (ref 2–14)
NEUTROPHILS # BLD AUTO: 8.4 K/UL — HIGH (ref 1.8–7.4)
NEUTROPHILS NFR BLD AUTO: 82 % — HIGH (ref 43–77)
NITRITE UR-MCNC: NEGATIVE — SIGNIFICANT CHANGE UP
NRBC # BLD: 0 /100 WBCS — SIGNIFICANT CHANGE UP (ref 0–0)
PH UR: 6 — SIGNIFICANT CHANGE UP (ref 5–8)
PHOSPHATE SERPL-MCNC: 4.2 MG/DL — SIGNIFICANT CHANGE UP (ref 2.5–4.5)
PLATELET # BLD AUTO: 202 K/UL — SIGNIFICANT CHANGE UP (ref 150–400)
POTASSIUM SERPL-MCNC: 4.1 MMOL/L — SIGNIFICANT CHANGE UP (ref 3.5–5.3)
POTASSIUM SERPL-MCNC: 4.7 MMOL/L — SIGNIFICANT CHANGE UP (ref 3.5–5.3)
POTASSIUM SERPL-SCNC: 4.1 MMOL/L — SIGNIFICANT CHANGE UP (ref 3.5–5.3)
POTASSIUM SERPL-SCNC: 4.7 MMOL/L — SIGNIFICANT CHANGE UP (ref 3.5–5.3)
PROT SERPL-MCNC: 7 G/DL — SIGNIFICANT CHANGE UP (ref 6–8.3)
PROT UR-MCNC: 30 MG/DL
RBC # BLD: 4.37 M/UL — SIGNIFICANT CHANGE UP (ref 4.2–5.8)
RBC # FLD: 12.5 % — SIGNIFICANT CHANGE UP (ref 10.3–14.5)
RBC CASTS # UR COMP ASSIST: 243 /HPF — HIGH (ref 0–4)
SODIUM SERPL-SCNC: 144 MMOL/L — SIGNIFICANT CHANGE UP (ref 135–145)
SODIUM SERPL-SCNC: 144 MMOL/L — SIGNIFICANT CHANGE UP (ref 135–145)
SP GR SPEC: 1.02 — SIGNIFICANT CHANGE UP (ref 1–1.03)
SQUAMOUS # UR AUTO: 3 /HPF — SIGNIFICANT CHANGE UP (ref 0–5)
UROBILINOGEN FLD QL: 0.2 MG/DL — SIGNIFICANT CHANGE UP (ref 0.2–1)
WBC # BLD: 10.73 K/UL — HIGH (ref 3.8–10.5)
WBC # FLD AUTO: 10.73 K/UL — HIGH (ref 3.8–10.5)
WBC UR QL: 3 /HPF — SIGNIFICANT CHANGE UP (ref 0–5)

## 2024-03-18 PROCEDURE — 93010 ELECTROCARDIOGRAM REPORT: CPT

## 2024-03-18 PROCEDURE — 99223 1ST HOSP IP/OBS HIGH 75: CPT

## 2024-03-18 PROCEDURE — 99285 EMERGENCY DEPT VISIT HI MDM: CPT

## 2024-03-18 RX ORDER — POLYETHYLENE GLYCOL 3350 17 G/17G
17 POWDER, FOR SOLUTION ORAL EVERY 24 HOURS
Refills: 0 | Status: DISCONTINUED | OUTPATIENT
Start: 2024-03-18 | End: 2024-03-19

## 2024-03-18 RX ORDER — HEPARIN SODIUM 5000 [USP'U]/ML
5000 INJECTION INTRAVENOUS; SUBCUTANEOUS EVERY 8 HOURS
Refills: 0 | Status: DISCONTINUED | OUTPATIENT
Start: 2024-03-18 | End: 2024-03-19

## 2024-03-18 RX ORDER — SODIUM CHLORIDE 9 MG/ML
1000 INJECTION, SOLUTION INTRAVENOUS
Refills: 0 | Status: DISCONTINUED | OUTPATIENT
Start: 2024-03-18 | End: 2024-03-19

## 2024-03-18 RX ORDER — DORZOLAMIDE HYDROCHLORIDE TIMOLOL MALEATE 20; 5 MG/ML; MG/ML
1 SOLUTION/ DROPS OPHTHALMIC EVERY 12 HOURS
Refills: 0 | Status: DISCONTINUED | OUTPATIENT
Start: 2024-03-18 | End: 2024-03-19

## 2024-03-18 RX ORDER — SODIUM CHLORIDE 9 MG/ML
500 INJECTION, SOLUTION INTRAVENOUS
Refills: 0 | Status: DISCONTINUED | OUTPATIENT
Start: 2024-03-18 | End: 2024-03-18

## 2024-03-18 RX ORDER — OXYBUTYNIN CHLORIDE 5 MG
5 TABLET ORAL ONCE
Refills: 0 | Status: COMPLETED | OUTPATIENT
Start: 2024-03-18 | End: 2024-03-18

## 2024-03-18 RX ORDER — SODIUM CHLORIDE 9 MG/ML
1000 INJECTION, SOLUTION INTRAVENOUS
Refills: 0 | Status: DISCONTINUED | OUTPATIENT
Start: 2024-03-18 | End: 2024-03-18

## 2024-03-18 RX ORDER — SENNA PLUS 8.6 MG/1
2 TABLET ORAL AT BEDTIME
Refills: 0 | Status: DISCONTINUED | OUTPATIENT
Start: 2024-03-18 | End: 2024-03-19

## 2024-03-18 RX ORDER — TAMSULOSIN HYDROCHLORIDE 0.4 MG/1
0.4 CAPSULE ORAL AT BEDTIME
Refills: 0 | Status: DISCONTINUED | OUTPATIENT
Start: 2024-03-18 | End: 2024-03-19

## 2024-03-18 RX ORDER — LATANOPROST 0.05 MG/ML
1 SOLUTION/ DROPS OPHTHALMIC; TOPICAL AT BEDTIME
Refills: 0 | Status: DISCONTINUED | OUTPATIENT
Start: 2024-03-19 | End: 2024-03-19

## 2024-03-18 RX ORDER — SODIUM CHLORIDE 9 MG/ML
500 INJECTION, SOLUTION INTRAVENOUS
Refills: 0 | Status: DISCONTINUED | OUTPATIENT
Start: 2024-03-18 | End: 2024-03-19

## 2024-03-18 RX ADMIN — POLYETHYLENE GLYCOL 3350 17 GRAM(S): 17 POWDER, FOR SOLUTION ORAL at 22:59

## 2024-03-18 RX ADMIN — SODIUM CHLORIDE 500 MILLILITER(S): 9 INJECTION, SOLUTION INTRAVENOUS at 20:35

## 2024-03-18 RX ADMIN — SENNA PLUS 2 TABLET(S): 8.6 TABLET ORAL at 22:59

## 2024-03-18 RX ADMIN — SODIUM CHLORIDE 100 MILLILITER(S): 9 INJECTION, SOLUTION INTRAVENOUS at 19:42

## 2024-03-18 RX ADMIN — SODIUM CHLORIDE 200 MILLILITER(S): 9 INJECTION, SOLUTION INTRAVENOUS at 23:39

## 2024-03-18 RX ADMIN — SODIUM CHLORIDE 250 MILLILITER(S): 9 INJECTION, SOLUTION INTRAVENOUS at 20:00

## 2024-03-18 RX ADMIN — HEPARIN SODIUM 5000 UNIT(S): 5000 INJECTION INTRAVENOUS; SUBCUTANEOUS at 22:58

## 2024-03-18 RX ADMIN — Medication 5 MILLIGRAM(S): at 19:10

## 2024-03-18 RX ADMIN — TAMSULOSIN HYDROCHLORIDE 0.4 MILLIGRAM(S): 0.4 CAPSULE ORAL at 22:59

## 2024-03-18 RX ADMIN — SODIUM CHLORIDE 250 MILLILITER(S): 9 INJECTION, SOLUTION INTRAVENOUS at 21:31

## 2024-03-18 NOTE — ED ADULT NURSE REASSESSMENT NOTE - NS ED NURSE REASSESS COMMENT FT1
RN X2 attempted to place indwelling Lagunas catheter. Sterile technique maintained. No resistance met, no urine flow notred. When catheter removed blood with blood clots removed. MD Carbone met

## 2024-03-18 NOTE — H&P ADULT - PROBLEM SELECTOR PLAN 2
#postrenal RAFIA    on admission found to have BUN 57/Cr 5.36. (baseline BUN/Cr 13/1.01 in 7/2022). Lagunas placed in ED, 1.7L UOP suggesting obstructive post-renal RAFIA. Denies recent abx, recent illness, frequent NSAID use, or change in diet/lifestyle. Drinks 2 beers daily, w/ significant smoking hx. Known hx of BPH, follows w/ urology outpatient.     - renal consulted, f/u recs  - strict I/Os, monitor UOP  - f/u renal ultrasound, r/o hydronephrosis  - f/u CTAP noncon  - q6 BMP, monitor lytes  - f/u CK, CMP, cystatin c  - avoid nephrotoxic meds #postrenal RAFIA    on admission found to have BUN 57/Cr 5.36. (baseline BUN/Cr 13/1.01 in 7/2022). Lagunas placed in ED, 1.7L UOP suggesting obstructive post-renal RAFIA. Denies recent abx, recent illness, frequent NSAID use, or change in diet/lifestyle. Drinks 2 beers daily, w/ significant smoking hx. Known hx of BPH, follows w/ urology outpatient.     - renal consulted, f/u recs  - strict I/Os, monitor UOP  - f/u renal ultrasound, r/o hydronephrosis  - q6 BMP, monitor lytes  - f/u CK, CMP, cystatin c  - avoid nephrotoxic meds

## 2024-03-18 NOTE — H&P ADULT - PROBLEM SELECTOR PLAN 5
F: 1/2NS @100cc/hr  E: replete w/ caution i/s/o RAFIA  N: Regular diet  GI: None  DVT: Heparin subq  Dispo: [facility]

## 2024-03-18 NOTE — ED ADULT TRIAGE NOTE - CHIEF COMPLAINT QUOTE
dysuria x 3 days. Hx of BPH, denies hematuria. Has needed lynne before for same issue. +c/o mild pain with urination and lower abd. bloating / pressure.

## 2024-03-18 NOTE — H&P ADULT - PROBLEM SELECTOR PLAN 1
Pt w/ known hx of BPH, presenting with decreased UOP x3 days. Also reports abd pain, weakness, and decreased appetite x3 days, however prior to that was in usual state of health. Denies recent illness, fevers/chills, n/v/d. Lagunas placed in ED, 1.7L UOP suggesting obstructive post-renal RAFIA. Known hx of BPH, follows w/ urology outpatient.    - c/w 1/2NS @100cc/hr x14 hours, monitor for post-obstructive diuresis  - strict I/O q8, monitor UOP  - f/u renal ultrasound  - urology followup  - f/u UA  - OOBTC, ambulate as tolerated Pt w/ known hx of BPH, presenting with decreased UOP x3 days. Also reports abd pain, weakness, and decreased appetite x3 days, however prior to that was in usual state of health. Denies recent illness, fevers/chills, n/v/d. Lagunas placed in ED, 1.7L UOP suggesting obstructive post-renal RAFIA. Known hx of BPH, follows w/ urology outpatient. s/p oxybutynin 5 x1.    - c/w 1/2NS @100cc/hr x14 hours, monitor for post-obstructive diuresis  - strict I/O q8, monitor UOP  - f/u renal ultrasound  - urology followup  - f/u UA  - OOBTC, ambulate as tolerated Pt w/ known hx of BPH, presenting with decreased UOP x3 days. Also reports abd pain, weakness, and decreased appetite x3 days, however prior to that was in usual state of health. Denies recent illness, fevers/chills, n/v/d. Lagunas placed in ED, 1.7L UOP suggesting obstructive post-renal RAFIA. Known hx of BPH, follows w/ urology outpatient. s/p oxybutynin 5 x1.    - 1/2NS @250cc/hr x2 hours, then adjust based on UOP q2  - strict I/O q2-4, monitor UOP  - f/u renal ultrasound  - urology followup  - f/u UA  - OOBTC, ambulate as tolerated

## 2024-03-18 NOTE — ED PROVIDER NOTE - CLINICAL SUMMARY MEDICAL DECISION MAKING FREE TEXT BOX
nursing staff unable to pass lynne, urology consutled for eval. renal failure present, yi admit for management of renal failure and observation for post obstructive diuresis.

## 2024-03-18 NOTE — ED ADULT NURSE NOTE - OBJECTIVE STATEMENT
68yoM PMH of BPH on Flomax came to ED c/o urinary retention x3 days. Pt states that he has had a hard time getting a steady flow, he will have an occasional dribble. Pt states he has had this problem before, 2 years ago. and needed to have a lynne placed for a week. Pt denies any blood in the urine, chest pain, SOB, N/V/D.

## 2024-03-18 NOTE — H&P ADULT - ATTENDING COMMENTS
67 yo M with PMHx severe BPH, hx bilateral inguinal hernia repair px from home with 3d hx of dysuria and minimal urine output s/p Lagunas placement in ED admitted for further management of urinary retention 2/2 likely BPH c/b obstructive RAFIA and monitoring of post-obstructive RAFIA.     #Urinary retention c/b obstructive RAFIA – Likely in setting of BPH. Hx of prior episode of urinary retention requiring Lagunas placement s/p hernia surgery. Previously followed outpatient with urology Dr Benavidez, lost to follow-up. VSS. BUN/Cr 57/5.36. Remainder of CMP within normal limits. s/p Lagunas placement in ED by Urology with 1.7L output. Serial BMP for electrolyte monitoring in post-obstructive diuresis. Strict UOP. IVF to match ~50% UOP. F/U renal US (assess for hydronephrosis, obstruction, etc.) Urology consulted/following.      Agree with remainder of resident plan as above.

## 2024-03-18 NOTE — H&P ADULT - NSHPLABSRESULTS_GEN_ALL_CORE
LABS                        13.8   10.73 )-----------( 202      ( 18 Mar 2024 17:40 )             42.2     03-18    144  |  107  |  57<H>  ----------------------------<  143<H>  4.7   |  25  |  5.36<H>    Ca    10.1      18 Mar 2024 17:40        Urinalysis Basic - ( 18 Mar 2024 17:40 )    Color: x / Appearance: x / SG: x / pH: x  Gluc: 143 mg/dL / Ketone: x  / Bili: x / Urobili: x   Blood: x / Protein: x / Nitrite: x   Leuk Esterase: x / RBC: x / WBC x   Sq Epi: x / Non Sq Epi: x / Bacteria: x              IMAGING/EKG/ETC

## 2024-03-18 NOTE — ED PROVIDER NOTE - OBJECTIVE STATEMENT
68M hx bph on tamsulosin. minimal urine output since friday. occasional dribbling. otherwise in usual state of health.

## 2024-03-18 NOTE — H&P ADULT - NSHPPHYSICALEXAM_GEN_ALL_CORE
General: NAD  Head: pupils reactive, mucous membranes dry  Neck: Supple; no JVD  Respiratory: CTAB; no wheezes/rales/rhonchi  Cardiovascular: Regular rhythm/rate; S1/S2+, no murmurs, rubs gallops   Gastrointestinal: Soft; distended, nontender  Extremities: WWP; no edema/cyanosis  Neurological: A&Ox3 [HIV Infection] : no HIV [Exposure To Gonorrhea] : no gonorrhea [Chlamydial Infections] : no chlamydia [Syphilis] : no syphilis [Herpes Simplex] : no genital herpes [Hepatitis, B Virus] : no Hepatitis B [Human Papilloma Virus Infection] : no genital warts [Hepatitis, C Virus] : no Hepatitis C [Trichomoniasis] : no trichomoniasis

## 2024-03-18 NOTE — ED PROVIDER NOTE - PHYSICAL EXAMINATION
General: Awake, alert and oriented. No acute distress.   Skin:  Appropriate color for ethnicity  HENMT: head normocephalic and atraumatic  EYES: Conjunctiva clear. nonicteric sclera  Cardiac: well perfused  Respiratory: breathing comfortably on room air  Abdominal: significiant suprapubic distension and mild ttp. no guarding.   MSK:  no visualized external signs of trauma. no apparent deficits in ROM of any extremity  Neurological: The patient is awake, alert and oriented with normal speech. CN 2-12 grossly intact. no apparent deficits. Memory is normal and thought process is intact. moving all extremities spontaneously   Psychiatric: Appropriate mood and affect. Good judgement and insight

## 2024-03-18 NOTE — ED ADULT NURSE NOTE - NSFALLUNIVINTERV_ED_ALL_ED
Bed/Stretcher in lowest position, wheels locked, appropriate side rails in place/Call bell, personal items and telephone in reach/Instruct patient to call for assistance before getting out of bed/chair/stretcher/Non-slip footwear applied when patient is off stretcher/[place] to call system/Physically safe environment - no spills, clutter or unnecessary equipment/Purposeful proactive rounding/Room/bathroom lighting operational, light cord in reach

## 2024-03-18 NOTE — H&P ADULT - ASSESSMENT
68M PMH BPH, glaucoma, PSH b/l inguinal hernia repairs (7/2022) presented with urinary retention x3 days possibly 2/2 known enlarged lynne, now s/p lynne placement in the ER, admitted for post-obstructive diuresis monitoring and further work up/eval of urinary retention.

## 2024-03-18 NOTE — CHART NOTE - NSCHARTNOTEFT_GEN_A_CORE
Consulted by Medicine team for RAFIA. Chart reviewed, patient is a 69 yo M w/ PMH of BPH and glaucoma presenting with inability to urinate for three days. Found to have sCr 5.36 with BUN 57. UA with proteinuria and hematuria. Urology consulted and lynne was placed with 1.7L UOP returned. Suspect RAFIA 2/2 obstructive uropathy. Recommend the following:    - Maintain lynne, strict I&O  - Monitor vitals  - Trend BMP, follow sCr, sNa  - Expect that patient will develop diuresis post-obstruction, can replace half urine output with 1/2NS  - Repeat full set of urine studies once lynne is removed  - Renal US  - Follow Urology recommendations for management of obstruction    Full consult to follow in the AM.

## 2024-03-18 NOTE — H&P ADULT - HISTORY OF PRESENT ILLNESS
68M PMH BPH, glaucoma, PSH b/l inguinal hernia repairs (7/2022) presents with reports of minimal urine output since Friday 3/15. Reports only occasional dribbling and has been unable to urinate normally. Also reports decreased appetite and abdominal pain over the last 3 days. Denies recent illness, abx use, changes in diet, strenuous activity. Sees urologist Dr. Devin Arauz, last appt 9/2023 at which time he reported nocturia. Was advised to continue taking his tamsulosin. PSA was checked at that appt and found to be elevated, and prostate was found to be very enlarged on imaging. He was advised to follow up 6 months later but has not seen his urologist since then as he is now out of network. Pt has had urinary retention before, after hernia repairs in 2022 for which lynne was placed, since resolved. Follows with PCP Dr Penny Bah, however has not followed up in 2 years.     ED course:   VS: T 98.6 F oral, HR 66, /67, RR 18, SpO2 96% on RA  Labs significant for: WBCs 10.73, BUN/Cr 57/5.36, eGFR 11  EKG: PENDING  Imaging: none  Interventions: Oxybutynin 5 mg PO  Consults: urology, nephrology

## 2024-03-18 NOTE — ED PROVIDER NOTE - NS ED ROS FT
General: Awake, alert and oriented. No acute distress.   Skin:  Appropriate color for ethnicity  HENMT: head normocephalic and atraumatic  EYES: Conjunctiva clear. nonicteric sclera  Cardiac: well perfused  Respiratory: breathing comfortably on room air  Abdominal: suprapubic distension. mild lower abdominal ttp.   MSK:  no visualized external signs of trauma. no apparent deficits in ROM of any extremity  Neurological: The patient is awake, alert and oriented with normal speech. CN 2-12 grossly intact. no apparent deficits. Memory is normal and thought process is intact. moving all extremities spontaneously   Psychiatric: Appropriate mood and affect. Good judgement and insight

## 2024-03-18 NOTE — H&P ADULT - PROBLEM SELECTOR PLAN 3
Home med: tamsulosin 0.4. Previously followed with Dr Arauz. MR Prostate 4/2022: PIRADS Very low. BPH. Huge gland. CT Pelvis 3/2022 s/f very severe prostatic enlargement.  - c/w home med  - Likely DC w/ lynne, TOV outpatient  - f/u w/ urology (pt needs new provider)

## 2024-03-19 ENCOUNTER — TRANSCRIPTION ENCOUNTER (OUTPATIENT)
Age: 69
End: 2024-03-19

## 2024-03-19 VITALS
HEART RATE: 63 BPM | TEMPERATURE: 99 F | SYSTOLIC BLOOD PRESSURE: 108 MMHG | RESPIRATION RATE: 18 BRPM | DIASTOLIC BLOOD PRESSURE: 66 MMHG | OXYGEN SATURATION: 96 %

## 2024-03-19 DIAGNOSIS — N17.0 ACUTE KIDNEY FAILURE WITH TUBULAR NECROSIS: ICD-10-CM

## 2024-03-19 DIAGNOSIS — N17.9 ACUTE KIDNEY FAILURE, UNSPECIFIED: ICD-10-CM

## 2024-03-19 LAB
ANION GAP SERPL CALC-SCNC: 8 MMOL/L — SIGNIFICANT CHANGE UP (ref 5–17)
ANION GAP SERPL CALC-SCNC: 8 MMOL/L — SIGNIFICANT CHANGE UP (ref 5–17)
ANION GAP SERPL CALC-SCNC: 9 MMOL/L — SIGNIFICANT CHANGE UP (ref 5–17)
APPEARANCE UR: ABNORMAL
BACTERIA # UR AUTO: NEGATIVE /HPF — SIGNIFICANT CHANGE UP
BASOPHILS # BLD AUTO: 0.01 K/UL — SIGNIFICANT CHANGE UP (ref 0–0.2)
BASOPHILS NFR BLD AUTO: 0.1 % — SIGNIFICANT CHANGE UP (ref 0–2)
BILIRUB UR-MCNC: NEGATIVE — SIGNIFICANT CHANGE UP
BUN SERPL-MCNC: 47 MG/DL — HIGH (ref 7–23)
BUN SERPL-MCNC: 48 MG/DL — HIGH (ref 7–23)
BUN SERPL-MCNC: 52 MG/DL — HIGH (ref 7–23)
CALCIUM SERPL-MCNC: 8.3 MG/DL — LOW (ref 8.4–10.5)
CALCIUM SERPL-MCNC: 8.6 MG/DL — SIGNIFICANT CHANGE UP (ref 8.4–10.5)
CALCIUM SERPL-MCNC: 9.2 MG/DL — SIGNIFICANT CHANGE UP (ref 8.4–10.5)
CAST: 3 /LPF — SIGNIFICANT CHANGE UP (ref 0–4)
CHLORIDE SERPL-SCNC: 106 MMOL/L — SIGNIFICANT CHANGE UP (ref 96–108)
CHLORIDE SERPL-SCNC: 107 MMOL/L — SIGNIFICANT CHANGE UP (ref 96–108)
CHLORIDE SERPL-SCNC: 107 MMOL/L — SIGNIFICANT CHANGE UP (ref 96–108)
CO2 SERPL-SCNC: 24 MMOL/L — SIGNIFICANT CHANGE UP (ref 22–31)
CO2 SERPL-SCNC: 24 MMOL/L — SIGNIFICANT CHANGE UP (ref 22–31)
CO2 SERPL-SCNC: 27 MMOL/L — SIGNIFICANT CHANGE UP (ref 22–31)
COLOR SPEC: YELLOW — SIGNIFICANT CHANGE UP
CREAT ?TM UR-MCNC: 116 MG/DL — SIGNIFICANT CHANGE UP
CREAT SERPL-MCNC: 1.97 MG/DL — HIGH (ref 0.5–1.3)
CREAT SERPL-MCNC: 2.76 MG/DL — HIGH (ref 0.5–1.3)
CREAT SERPL-MCNC: 3.06 MG/DL — HIGH (ref 0.5–1.3)
DIFF PNL FLD: ABNORMAL
EGFR: 22 ML/MIN/1.73M2 — LOW
EGFR: 24 ML/MIN/1.73M2 — LOW
EGFR: 37 ML/MIN/1.73M2 — LOW
EOSINOPHIL # BLD AUTO: 0 K/UL — SIGNIFICANT CHANGE UP (ref 0–0.5)
EOSINOPHIL NFR BLD AUTO: 0 % — SIGNIFICANT CHANGE UP (ref 0–6)
GLUCOSE SERPL-MCNC: 100 MG/DL — HIGH (ref 70–99)
GLUCOSE SERPL-MCNC: 117 MG/DL — HIGH (ref 70–99)
GLUCOSE SERPL-MCNC: 93 MG/DL — SIGNIFICANT CHANGE UP (ref 70–99)
GLUCOSE UR QL: NEGATIVE MG/DL — SIGNIFICANT CHANGE UP
HCT VFR BLD CALC: 42.6 % — SIGNIFICANT CHANGE UP (ref 39–50)
HCV AB S/CO SERPL IA: 0.04 S/CO — SIGNIFICANT CHANGE UP
HCV AB SERPL-IMP: SIGNIFICANT CHANGE UP
HGB BLD-MCNC: 13.4 G/DL — SIGNIFICANT CHANGE UP (ref 13–17)
IMM GRANULOCYTES NFR BLD AUTO: 0.3 % — SIGNIFICANT CHANGE UP (ref 0–0.9)
KETONES UR-MCNC: NEGATIVE MG/DL — SIGNIFICANT CHANGE UP
LEUKOCYTE ESTERASE UR-ACNC: ABNORMAL
LYMPHOCYTES # BLD AUTO: 1.16 K/UL — SIGNIFICANT CHANGE UP (ref 1–3.3)
LYMPHOCYTES # BLD AUTO: 14.7 % — SIGNIFICANT CHANGE UP (ref 13–44)
MAGNESIUM SERPL-MCNC: 2.3 MG/DL — SIGNIFICANT CHANGE UP (ref 1.6–2.6)
MCHC RBC-ENTMCNC: 30.5 PG — SIGNIFICANT CHANGE UP (ref 27–34)
MCHC RBC-ENTMCNC: 31.5 GM/DL — LOW (ref 32–36)
MCV RBC AUTO: 97 FL — SIGNIFICANT CHANGE UP (ref 80–100)
MONOCYTES # BLD AUTO: 0.87 K/UL — SIGNIFICANT CHANGE UP (ref 0–0.9)
MONOCYTES NFR BLD AUTO: 11 % — SIGNIFICANT CHANGE UP (ref 2–14)
NEUTROPHILS # BLD AUTO: 5.82 K/UL — SIGNIFICANT CHANGE UP (ref 1.8–7.4)
NEUTROPHILS NFR BLD AUTO: 73.9 % — SIGNIFICANT CHANGE UP (ref 43–77)
NITRITE UR-MCNC: NEGATIVE — SIGNIFICANT CHANGE UP
NRBC # BLD: 0 /100 WBCS — SIGNIFICANT CHANGE UP (ref 0–0)
PH UR: 5.5 — SIGNIFICANT CHANGE UP (ref 5–8)
PHOSPHATE SERPL-MCNC: 4.9 MG/DL — HIGH (ref 2.5–4.5)
PLATELET # BLD AUTO: 199 K/UL — SIGNIFICANT CHANGE UP (ref 150–400)
POTASSIUM SERPL-MCNC: 3.7 MMOL/L — SIGNIFICANT CHANGE UP (ref 3.5–5.3)
POTASSIUM SERPL-MCNC: 3.9 MMOL/L — SIGNIFICANT CHANGE UP (ref 3.5–5.3)
POTASSIUM SERPL-MCNC: 4.2 MMOL/L — SIGNIFICANT CHANGE UP (ref 3.5–5.3)
POTASSIUM SERPL-SCNC: 3.7 MMOL/L — SIGNIFICANT CHANGE UP (ref 3.5–5.3)
POTASSIUM SERPL-SCNC: 3.9 MMOL/L — SIGNIFICANT CHANGE UP (ref 3.5–5.3)
POTASSIUM SERPL-SCNC: 4.2 MMOL/L — SIGNIFICANT CHANGE UP (ref 3.5–5.3)
PROT ?TM UR-MCNC: 220 MG/DL — HIGH (ref 0–12)
PROT UR-MCNC: 300 MG/DL
PROT/CREAT UR-RTO: 1.9 RATIO — HIGH (ref 0–0.2)
RBC # BLD: 4.39 M/UL — SIGNIFICANT CHANGE UP (ref 4.2–5.8)
RBC # FLD: 12.2 % — SIGNIFICANT CHANGE UP (ref 10.3–14.5)
RBC CASTS # UR COMP ASSIST: 129 /HPF — HIGH (ref 0–4)
SODIUM SERPL-SCNC: 139 MMOL/L — SIGNIFICANT CHANGE UP (ref 135–145)
SODIUM SERPL-SCNC: 139 MMOL/L — SIGNIFICANT CHANGE UP (ref 135–145)
SODIUM SERPL-SCNC: 142 MMOL/L — SIGNIFICANT CHANGE UP (ref 135–145)
SODIUM UR-SCNC: 35 MMOL/L — SIGNIFICANT CHANGE UP
SP GR SPEC: 1.01 — SIGNIFICANT CHANGE UP (ref 1–1.03)
SQUAMOUS # UR AUTO: 5 /HPF — SIGNIFICANT CHANGE UP (ref 0–5)
UROBILINOGEN FLD QL: 1 MG/DL — SIGNIFICANT CHANGE UP (ref 0.2–1)
WBC # BLD: 7.88 K/UL — SIGNIFICANT CHANGE UP (ref 3.8–10.5)
WBC # FLD AUTO: 7.88 K/UL — SIGNIFICANT CHANGE UP (ref 3.8–10.5)
WBC UR QL: 16 /HPF — HIGH (ref 0–5)

## 2024-03-19 PROCEDURE — 99285 EMERGENCY DEPT VISIT HI MDM: CPT

## 2024-03-19 PROCEDURE — 84300 ASSAY OF URINE SODIUM: CPT

## 2024-03-19 PROCEDURE — 36415 COLL VENOUS BLD VENIPUNCTURE: CPT

## 2024-03-19 PROCEDURE — 85025 COMPLETE CBC W/AUTO DIFF WBC: CPT

## 2024-03-19 PROCEDURE — 82570 ASSAY OF URINE CREATININE: CPT

## 2024-03-19 PROCEDURE — 84100 ASSAY OF PHOSPHORUS: CPT

## 2024-03-19 PROCEDURE — 76770 US EXAM ABDO BACK WALL COMP: CPT

## 2024-03-19 PROCEDURE — 80048 BASIC METABOLIC PNL TOTAL CA: CPT

## 2024-03-19 PROCEDURE — 99239 HOSP IP/OBS DSCHRG MGMT >30: CPT | Mod: GC

## 2024-03-19 PROCEDURE — 82550 ASSAY OF CK (CPK): CPT

## 2024-03-19 PROCEDURE — 76770 US EXAM ABDO BACK WALL COMP: CPT | Mod: 26

## 2024-03-19 PROCEDURE — 84156 ASSAY OF PROTEIN URINE: CPT

## 2024-03-19 PROCEDURE — 82610 CYSTATIN C: CPT

## 2024-03-19 PROCEDURE — 83735 ASSAY OF MAGNESIUM: CPT

## 2024-03-19 PROCEDURE — 85027 COMPLETE CBC AUTOMATED: CPT

## 2024-03-19 PROCEDURE — 80076 HEPATIC FUNCTION PANEL: CPT

## 2024-03-19 PROCEDURE — 93005 ELECTROCARDIOGRAM TRACING: CPT

## 2024-03-19 PROCEDURE — 86803 HEPATITIS C AB TEST: CPT

## 2024-03-19 PROCEDURE — 99223 1ST HOSP IP/OBS HIGH 75: CPT

## 2024-03-19 PROCEDURE — 81001 URINALYSIS AUTO W/SCOPE: CPT

## 2024-03-19 RX ORDER — SODIUM CHLORIDE 9 MG/ML
1000 INJECTION, SOLUTION INTRAVENOUS
Refills: 0 | Status: DISCONTINUED | OUTPATIENT
Start: 2024-03-19 | End: 2024-03-19

## 2024-03-19 RX ORDER — SENNA PLUS 8.6 MG/1
2 TABLET ORAL
Qty: 0 | Refills: 0 | DISCHARGE
Start: 2024-03-19

## 2024-03-19 RX ORDER — POLYETHYLENE GLYCOL 3350 17 G/17G
17 POWDER, FOR SOLUTION ORAL
Qty: 0 | Refills: 0 | DISCHARGE
Start: 2024-03-19

## 2024-03-19 RX ADMIN — HEPARIN SODIUM 5000 UNIT(S): 5000 INJECTION INTRAVENOUS; SUBCUTANEOUS at 14:59

## 2024-03-19 RX ADMIN — DORZOLAMIDE HYDROCHLORIDE TIMOLOL MALEATE 1 DROP(S): 20; 5 SOLUTION/ DROPS OPHTHALMIC at 00:44

## 2024-03-19 RX ADMIN — SODIUM CHLORIDE 150 MILLILITER(S): 9 INJECTION, SOLUTION INTRAVENOUS at 05:24

## 2024-03-19 RX ADMIN — SODIUM CHLORIDE 160 MILLILITER(S): 9 INJECTION, SOLUTION INTRAVENOUS at 03:05

## 2024-03-19 RX ADMIN — SODIUM CHLORIDE 150 MILLILITER(S): 9 INJECTION, SOLUTION INTRAVENOUS at 00:55

## 2024-03-19 RX ADMIN — DORZOLAMIDE HYDROCHLORIDE TIMOLOL MALEATE 1 DROP(S): 20; 5 SOLUTION/ DROPS OPHTHALMIC at 10:13

## 2024-03-19 RX ADMIN — HEPARIN SODIUM 5000 UNIT(S): 5000 INJECTION INTRAVENOUS; SUBCUTANEOUS at 06:53

## 2024-03-19 NOTE — DISCHARGE NOTE PROVIDER - NSDCFUSCHEDAPPT_GEN_ALL_CORE_FT
Piper Liu  [facility] Physician [facility]  UROLOGY [address]  Scheduled Appointment: 03/22/2024     Piper Liu Physician [facility]  UROLOGY [address]  Scheduled Appointment: 03/22/2024    Ashley Bah  [place] Physician [facility]  INTMED [id number]  Scheduled Appointment: 03/25/2024

## 2024-03-19 NOTE — DISCHARGE NOTE NURSING/CASE MANAGEMENT/SOCIAL WORK - PATIENT PORTAL LINK FT
You can access the FollowMyHealth Patient Portal offered by [facility] by registering at the following website: [email or web address]. By joining [facility]’s FollowMyHealth portal, you will also be able to view your health information using other applications (apps) compatible with our system.

## 2024-03-19 NOTE — CHART NOTE - NSCHARTNOTEFT_GEN_A_CORE
Lagunas Catheter Care, Adult. All info below:    TAKING CARE OF THE CATHETER   Wash your hands w soap & water.   Using mild soap & warm water on a clean washcloth:  Clean the area on your body closest to the catheter insertion site using a circular motion, moving away from the catheter. Never wipe toward the catheter because this could sweep bacteria up into the urethra & cause infection.   Remove all traces of soap. Pat the area dry w a clean towel. For males, reposition the foreskin.    Attach the catheter to your leg so there is no tension on the catheter. Use adhesive tape or a leg strap. If you are using adhesive tape, remove any sticky residue left behind by the previous tape you used.   Keep the drainage bag below the level of the bladder, but keep it off the floor.   Check throughout the day to be sure the catheter is working & urine is draining freely. Make sure the tubing does not become kinked.  Do not pull on the catheter or try to remove it. Pulling could damage internal tissues.    TAKING CARE OF THE DRAINAGE BAGS  You will be given two drainage bags to take home. One is a large overnight drainage bag, & the other is a smaller leg bag that fits underneath clothing. You may wear the overnight bag at any time, but you should never wear the smaller leg bag at night. Follow the instructions below for how to empty, change, & clean your drainage bags.    Emptying: You must empty your drainage bag when it is ?–½ full or at least 2–3 times a day.   Wash your hands w soap & water.   Keep the drainage bag below your hips, below the level of your bladder. This stops urine from going back into the tubing & into your bladder.    Hold the dirty bag over the toilet or a clean container.   Open the pour spout at the bottom of the bag & empty the urine into the toilet or container. Do not let the pour spout touch the toilet, container, or any other surface. Doing so can place bacteria on the bag, which can cause an infection.   Clean the pour spout w a gauze pad or cotton ball that has rubbing alcohol on it.   Close the pour spout.   Attach the bag to your leg w adhesive tape or a leg strap.   Wash your hands well.    Flushing:   Flushing is a procedure used to help keep urine flowing if you suspect that your catheter is plugged.  To flush your catheter, you need the following supplies: Saline syringe, Alcohol wipes    Follow these steps to flush your catheter:  Wipe the access port with an alcohol wipe.  Remove the cap from the saline syringe.  Attach the syringe to the access port.  Pinch off the tubing below the access port to stop urine flow.  Slowly push in the saline solution.  Once the syringe is empty, disconnect it from the access port.  Keeping the tubing pinched, repeat steps 2-5 as needed until you have instilled 30-60mL of saline into the bladder.  Release the pinched tubing to allow urine to flow    Changing: Change your drainage bag once a month or sooner if it starts to smell bad or look dirty. Below are steps to follow when changing the drainage bag.   Wash your hands w soap & water.   Pinch off the rubber catheter so that urine does not spill out.   Disconnect the catheter tube from the drainage tube at the connection valve. Do not let the tubes touch any surface.    Clean the end of the catheter tube w an alcohol wipe. Use a different alcohol wipe to clean the end of the drainage tube.   Connect the catheter tube to the drainage tube of the clean drainage bag.   Attach the new bag to the leg w adhesive tape or a leg strap. Avoid attaching the new bag too tightly.    Wash your hands well.    Cleaning:  Wash your hands w soap & water.   Wash the bag in warm, soapy water.   Rinse the bag thoroughly w warm water.   Fill the bag w a solution of white vinegar & water (1 cup vinegar to 1 qt warm water [.2 L vinegar to 1 L warm water]). Close the bag & soak it for 30 minutes in the solution.    Rinse the bag w warm water.    Hang the bag to dry w the pour spout open & hanging downward.   Store the clean bag (once it is dry) in a clean plastic bag.   Wash your hands well.     PREVENTING INFECTION  Wash your hands before & after handling your catheter.  Take showers daily & wash the area where the catheter enters your body. Do not take baths. Replace wet leg straps w dry ones, if this applies.  Do not use powders, sprays, or lotions on the genital area. Only use creams, lotions, or ointments as directed by your caregiver.  For females, wipe from front to back after each bowel movement.   Drink enough fluids to keep your urine clear or pale yellow unless you have a fluid restriction.   Do not let the drainage bag or tubing touch or lie on the floor.   Wear cotton underwear to absorb moisture & to keep your .    SEEK MEDICAL CARE IF:  Your urine is cloudy or smells unusually bad.  Your catheter becomes clogged.  You are not draining urine into the bag or your bladder feels full.  Your catheter starts to leak.    SEEK IMMEDIATE MEDICAL CARE IF:  You have pain, swelling, redness, or pus where the catheter enters the body.  You have pain in the abdomen, legs, lower back, or bladder.  You have a fever.  You see blood fill the catheter, or your urine is pink or red.  You have nausea, vomiting, or chills.  Your catheter gets pulled out.

## 2024-03-19 NOTE — CONSULT NOTE ADULT - ATTENDING COMMENTS
69 yo man with azotemia in setting of Uretention from BPH  creat dropping nicely after lynne insertion. received several boluses of IVF overnight  rapid diuresis- encouraged to maintain high oral fluid intake  may need standing IVF instead of present boluses if UO remains brisk and he is unable to drink volume equal to losses

## 2024-03-19 NOTE — PROGRESS NOTE ADULT - PROBLEM SELECTOR PLAN 2
#postrenal RAFIA    Improving. On admission found to have BUN 57/Cr 5.36. (baseline BUN/Cr 13/1.01 in 7/2022). Lagunas placed in ED, 1.7L UOP suggesting obstructive post-renal RAFIA. Denies recent abx, recent illness, frequent NSAID use, or change in diet/lifestyle. Drinks 2 beers daily, w/ significant smoking hx. Known hx of BPH, follows w/ urology outpatient. , AST/ALT WNL    - renal consulted, f/u recs  - strict I/Os, monitor UOP  - f/u renal ultrasound, r/o hydronephrosis  - q6 BMP, monitor lytes  - f/u cystatin c  - avoid nephrotoxic meds #postrenal ATN    Improving. On admission found to have BUN 57/Cr 5.36. (baseline BUN/Cr 13/1.01 in 7/2022). Lagunas placed in ED, 1.7L UOP suggesting obstructive post-renal RAFIA. Denies recent abx, recent illness, frequent NSAID use, or change in diet/lifestyle. Drinks 2 beers daily, w/ significant smoking hx. Known hx of BPH, follows w/ urology outpatient. , AST/ALT WNL    - renal consulted, f/u recs  - strict I/Os, monitor UOP  - f/u renal ultrasound, r/o hydronephrosis  - q6 BMP, monitor lytes  - f/u cystatin c  - avoid nephrotoxic meds

## 2024-03-19 NOTE — DISCHARGE NOTE PROVIDER - NSDCMRMEDTOKEN_GEN_ALL_CORE_FT
dorzolamide-timolol 2.23%-0.68% ophthalmic solution: 1 drop(s) to each affected eye 2 times a day  latanoprost 0.005% ophthalmic solution: 1 drop(s) to each affected eye once a day (in the evening)  tamsulosin 0.4 mg oral capsule: 1 cap(s) orally once a day   dorzolamide-timolol 2.23%-0.68% ophthalmic solution: 1 drop(s) to each affected eye 2 times a day  latanoprost 0.005% ophthalmic solution: 1 drop(s) to each affected eye once a day (in the evening)  polyethylene glycol 3350 oral powder for reconstitution: 17 gram(s) orally every 24 hours  senna leaf extract oral tablet: 2 tab(s) orally once a day (at bedtime)  tamsulosin 0.4 mg oral capsule: 1 cap(s) orally once a day

## 2024-03-19 NOTE — DISCHARGE NOTE PROVIDER - PROVIDER TOKENS
PROVIDER:[TOKEN:[[id number]],FOLLOWUP:[1-3 days]] PROVIDER:[TOKEN:[[id number]],FOLLOWUP:[1-3 days]],PROVIDER:[TOKEN:[[id number]],FOLLOWUP:[2 weeks],ESTABLISHEDPATIENT:[T]] PROVIDER:[TOKEN:[[id number]],FOLLOWUP:[2 weeks]],PROVIDER:[TOKEN:[[id number]],SCHEDULEDAPPT:[03/22/2024],SCHEDULEDAPPTTIME:[01:30 PM]] PROVIDER:[TOKEN:[[id number]],SCHEDULEDAPPT:[03/25/2024],SCHEDULEDAPPTTIME:[03:00 PM],ESTABLISHEDPATIENT:[T]],PROVIDER:[TOKEN:[[id number]],SCHEDULEDAPPT:[03/22/2024],SCHEDULEDAPPTTIME:[01:30 PM]]

## 2024-03-19 NOTE — PROGRESS NOTE ADULT - PROBLEM SELECTOR PLAN 3
#postrenal ATN    Improving. On admission found to have BUN 57/Cr 5.36. (baseline BUN/Cr 13/1.01 in 7/2022). Lagunas placed in ED, 1.7L UOP suggesting obstructive post-renal RAFIA. Denies recent abx, recent illness, frequent NSAID use, or change in diet/lifestyle. Drinks 2 beers daily, w/ significant smoking hx. Known hx of BPH, follows w/ urology outpatient. , AST/ALT WNL    - renal consulted, f/u recs  - strict I/Os, monitor UOP  - f/u renal ultrasound, r/o hydronephrosis  - q6 BMP, monitor lytes  - f/u cystatin c  - avoid nephrotoxic meds

## 2024-03-19 NOTE — DISCHARGE NOTE NURSING/CASE MANAGEMENT/SOCIAL WORK - NSDCFUADDAPPT_GEN_ALL_CORE_FT
Please bring your Insurance card, Photo ID and Discharge paperwork to the following appointments:    (1) Please follow up with your Urology Provider, Dr. Piper Liu on [address], [address], [place] [address] on 3/22/2024 at 1:30pm.    Appointment was scheduled by Ms. RC Conner, Referral Coordinator.    (2) Please follow up with your Primary Care Provider, Dr. Ashley Bah at [address] [place] [address] on 3/25/2024 at 3:00pm.    Appointment was scheduled by Ms. RC Conner, Referral Coordinator.

## 2024-03-19 NOTE — PROGRESS NOTE ADULT - PROBLEM SELECTOR PLAN 1
Pt w/ known hx of BPH, presenting with decreased UOP x3 days. Also reports abd pain, weakness, and decreased appetite x3 days, however prior to that was in usual state of health. Denies recent illness, fevers/chills, n/v/d. Lagunas placed in ED, 1.7L UOP suggesting obstructive post-renal RAFIA. Known hx of BPH, follows w/ urology outpatient. s/p oxybutynin 5 x1.    - 1/2NS @250cc/hr x2 hours, then adjust based on UOP q2  - strict I/O q2-4, monitor UOP  - f/u renal ultrasound  - urology followup  - f/u UA  - OOBTC, ambulate as tolerated

## 2024-03-19 NOTE — DISCHARGE NOTE PROVIDER - CARE PROVIDER_API CALL
Jen Merino  Urology  [address] [address]  Phone: [phone]  Fax: [phone]  Follow Up Time: 1-3 days   Jen Merino  Urology  [address] [address]  Phone: [phone]  Fax: [phone]  Follow Up Time: 1-3 days    Ashley Bah  Internal Medicine  [address] [place] [address]  Phone: [phone]  Fax: [phone]  Established Patient  Follow Up Time: 2 weeks   Ashley Bah  Internal Medicine  [address] [place] [address], [address]  [place] [address]  Phone: [phone]  Fax: [phone]  Follow Up Time: 2 weeks    Piper Liu  Urology  [address], [address]  [place] [address]  Phone: [phone]  Fax: [phone]  Scheduled Appointment: 03/22/2024 01:30 PM   Ashley Bah  Internal Medicine  [facility] [place] [address], [address]  [place] [address]  Phone: [phone]  Fax: [phone]  Established Patient  Scheduled Appointment: 03/25/2024 03:00 PM    Piper Liu  Urology  [address], [address]  [place] [address]  Phone: [phone]  Fax: [phone]  Scheduled Appointment: 03/22/2024 01:30 PM

## 2024-03-19 NOTE — PROGRESS NOTE ADULT - SUBJECTIVE AND OBJECTIVE BOX
O/N Events: Fluids adjusted overnight based on urine output, repeat BMP with improvement in creatinine    Subjective/ROS: Patient seen and examined at bedside. Resting comfortably, states sx have significantly improved. Denies abd pain, dysuria, n/v, headache.    VITALS  Vital Signs Last 24 Hrs  T(C): 36.8 (19 Mar 2024 06:24), Max: 37.6 (18 Mar 2024 17:01)  T(F): 98.2 (19 Mar 2024 06:24), Max: 99.6 (18 Mar 2024 17:01)  HR: 61 (19 Mar 2024 06:24) (61 - 71)  BP: 110/64 (19 Mar 2024 06:24) (110/64 - 151/81)  BP(mean): 79 (19 Mar 2024 06:24) (79 - 85)  RR: 18 (19 Mar 2024 06:24) (16 - 18)  SpO2: 97% (19 Mar 2024 06:24) (95% - 97%)    Parameters below as of 19 Mar 2024 06:24  Patient On (Oxygen Delivery Method): room air        CAPILLARY BLOOD GLUCOSE          PHYSICAL EXAM  General: NAD  Head: pupils reactive, MMM  Neck: Supple; no JVD  Respiratory: CTAB; no wheezes/rales/rhonchi  Cardiovascular: Regular rhythm/rate; S1/S2+, no murmurs, rubs gallops   Gastrointestinal: Soft; NTND; bowel sounds normal and present  Extremities: WWP; no edema/cyanosis  Neurological: A&Ox3    MEDICATIONS  (STANDING):  dorzolamide 2%/timolol 0.5% Ophthalmic Solution 1 Drop(s) Both EYES every 12 hours  heparin   Injectable 5000 Unit(s) SubCutaneous every 8 hours  latanoprost 0.005% Ophthalmic Solution 1 Drop(s) Both EYES at bedtime  polyethylene glycol 3350 17 Gram(s) Oral every 24 hours  senna 2 Tablet(s) Oral at bedtime  tamsulosin 0.4 milliGRAM(s) Oral at bedtime    MEDICATIONS  (PRN):      No Known Allergies      LABS                        13.4   7.88  )-----------( 199      ( 19 Mar 2024 05:30 )             42.6     03-19    142  |  106  |  47<H>  ----------------------------<  93  4.2   |  27  |  2.76<H>    Ca    9.2      19 Mar 2024 05:30  Phos  4.9     03-  Mg     2.3     -    TPro  7.0  /  Alb  4.1  /  TBili  0.7  /  DBili  0.2  /  AST  36  /  ALT  22  /  AlkPhos  55  -18      Urinalysis Basic - ( 19 Mar 2024 08:55 )    Color: Yellow / Appearance: Cloudy / S.015 / pH: x  Gluc: x / Ketone: Negative mg/dL  / Bili: Negative / Urobili: 1.0 mg/dL   Blood: x / Protein: 300 mg/dL / Nitrite: Negative   Leuk Esterase: Small / RBC: 129 /HPF / WBC 16 /HPF   Sq Epi: x / Non Sq Epi: 5 /HPF / Bacteria: Negative /HPF      CARDIAC MARKERS ( 18 Mar 2024 17:40 )  x     / x     / 545 U/L / x     / x            Urinalysis with Rflx Culture (collected 24 @ 17:08)        IMAGING/EKG/ETC

## 2024-03-19 NOTE — CONSULT NOTE ADULT - ASSESSMENT
68-year-old male with BPH admitted for RAFIA with creatinine 5.3 after presenting with 3 days of inability urinate.  Lagunas placed in ER resulting in 1.7L UO.  Suspected RAFIA 2/2 obstructive uropathy, creatinine now improving.  Patient with postobstructive diuresis, required IV fluids overnight, now maintaining BP and without any hypovolemic symptoms off of IV fluids    #Nonoliguric RAFIA 2/2 obstructive uropathy  Creatinine down to 2.7 now  UA noted with protein (UPCR 1.9), hematuria  UO 3.1L since admission, net -0.8 L    Recommend:  Patient asymptomatic at this time.  Keeping up p.o. intake.  Denies any dizziness or lightheadedness when ambulating  Maintain Lagunas.  Okay to monitor off of IV fluids.  If hypotension or above symptoms, can give fluid bolus (250-500ml)  BMP daily  Appreciate urology input  Repeat UA.  If persistent proteinuria and/or hematuria, would need to follow-up with nephrology as outpatient

## 2024-03-19 NOTE — DISCHARGE NOTE PROVIDER - NSDCFUADDAPPT_GEN_ALL_CORE_FT
Please bring your Insurance card, Photo ID and Discharge paperwork to the following appointment:    (1) Please follow up with your Urology Provider, Dr. Piper Liu on [address], [address], [place] [address] on 3/22/2024 at 1:30pm.    Appointment was scheduled by Ms. CR Conner, Referral Coordinator.   Please bring your Insurance card, Photo ID and Discharge paperwork to the following appointments:    (1) Please follow up with your Urology Provider, Dr. Piper Liu on [address], [address], [place] [address] on 3/22/2024 at 1:30pm.    Appointment was scheduled by Ms. RC Conner, Referral Coordinator.    (2) Please follow up with your Primary Care Provider, Dr. Ashley Bah at [address] [place] [address] on 3/25/2024 at 3:00pm.    Appointment was scheduled by Ms. RC Conner, Referral Coordinator.

## 2024-03-19 NOTE — SBIRT NOTE ADULT - NSSBIRTALCPOSREINDET_GEN_A_CORE
Patient is within normal low risk drinking range for his demographic. Patient declined additional resources.

## 2024-03-19 NOTE — DISCHARGE NOTE PROVIDER - CARE PROVIDERS DIRECT ADDRESSES
[email or web address],DirectAddress_Unknown ,DirectAddress_Unknown,[email or web address]

## 2024-03-19 NOTE — DISCHARGE NOTE PROVIDER - HOSPITAL COURSE
69yo M PMH BPH, glaucoma, PSH b/l inguinal hernia repairs (7/2022) presented with urinary retention x3 days possibly 2/2 known enlarged lynne, now s/p lynne placement in the ER, admitted for post-obstructive diuresis monitoring and further work up/eval of urinary retention.    Problem List/Main Diagnoses:   #Acute urinary retention.   Pt w/ known hx of BPH, presenting with decreased UOP x3 days. Also reports abd pain, weakness, and decreased appetite x3 days, however prior to that was in usual state of health. Denies recent illness, fevers/chills, n/v/d. Lynne placed in ED, 1.7L UOP suggesting obstructive post-renal RAFIA. Known hx of BPH, follows w/ urology outpatient. s/p oxybutynin 5 x1. Received fluid resuscitation x12 hours on admission.   - continue BPH meds  - urology followup, TOV outpatient  - OOBTC, ambulate as tolerated    #RAFIA (acute kidney injury).   #postrenal RAFIA  On admission found to have BUN 57/Cr 5.36. (baseline BUN/Cr 13/1.01 in 7/2022). Lynne placed in ED, 1.7L UOP suggesting obstructive post-renal RAFIA. Denies recent abx, recent illness, frequent NSAID use, or change in diet/lifestyle. Drinks 2 beers daily, w/ significant smoking hx. Known hx of BPH, follows w/ urology outpatient. , AST/ALT WNL  - f/u with PCP outpatient    #BPH (benign prostatic hyperplasia).   Home med: tamsulosin 0.4. Previously followed with Dr Arauz. MR Prostate 4/2022: PIRADS Very low. BPH. Huge gland. CT Pelvis 3/2022 s/f very severe prostatic enlargement.  - c/w home med  - DC w/ lynne, TOV outpatient  - f/u w/ urology (pt needs new provider).    #Glaucoma.    Home meds: latanoprost eye drops qd, dorzolamide eye drops q12  - c/w home meds.    Patient was discharged to: Home    New medications: None  Changes to old medications: None  Medications that were stopped: None    Items to follow up as outpatient: PCP, Urology    Physical exam at the time of discharge:   General: NAD  Head: pupils reactive, MMM  Neck: Supple; no JVD  Respiratory: CTAB; no wheezes/rales/rhonchi  Cardiovascular: Regular rhythm/rate; S1/S2+, no murmurs, rubs gallops   Gastrointestinal: Soft; NTND; bowel sounds normal and present  Extremities: WWP; no edema/cyanosis  Neurological: A&Ox3       69yo M PMH BPH, glaucoma, PSH b/l inguinal hernia repairs (7/2022) presented with urinary retention x3 days possibly 2/2 known enlarged lynne, now s/p lynne placement in the ER, admitted for post-obstructive diuresis monitoring and further work up/eval of urinary retention.    Problem List/Main Diagnoses:   #Acute urinary retention.   Pt w/ known hx of BPH, presenting with decreased UOP x3 days. Also reports abd pain, weakness, and decreased appetite x3 days, however prior to that was in usual state of health. Denies recent illness, fevers/chills, n/v/d. Lynne placed in ED, 1.7L UOP suggesting obstructive post-renal RAFIA. Known hx of BPH, follows w/ urology outpatient. s/p oxybutynin 5 x1. Received fluid resuscitation x12 hours on admission.   - continue BPH meds  - urology followup, TOV outpatient  - OOBTC, ambulate as tolerated    #Acute tubular necrosis (ATN)  #postrenal RAFIA  On admission found to have BUN 57/Cr 5.36. (baseline BUN/Cr 13/1.01 in 7/2022). Lynne placed in ED, 1.7L UOP suggesting obstructive post-renal RAFIA. + Casts seen in UA from 3/18  Denies recent abx, recent illness, frequent NSAID use, or change in diet/lifestyle. Drinks 2 beers daily, w/ significant smoking hx. Known hx of BPH, follows w/ urology outpatient. , AST/ALT WNL  - f/u with PCP outpatient    #BPH (benign prostatic hyperplasia).   Home med: tamsulosin 0.4. Previously followed with Dr Arauz. MR Prostate 4/2022: PIRADS Very low. BPH. Huge gland. CT Pelvis 3/2022 s/f very severe prostatic enlargement.  - c/w home med  - DC w/ lynne, TOV outpatient  - f/u w/ urology (pt needs new provider).    #Glaucoma.    Home meds: latanoprost eye drops qd, dorzolamide eye drops q12  - c/w home meds.    Patient was discharged to: Home    New medications: None  Changes to old medications: None  Medications that were stopped: None    Items to follow up as outpatient: PCP, Urology    Physical exam at the time of discharge:   General: NAD  Head: pupils reactive, MMM  Neck: Supple; no JVD  Respiratory: CTAB; no wheezes/rales/rhonchi  Cardiovascular: Regular rhythm/rate; S1/S2+, no murmurs, rubs gallops   Gastrointestinal: Soft; NTND; bowel sounds normal and present  Extremities: WWP; no edema/cyanosis  Neurological: A&Ox3

## 2024-03-19 NOTE — DISCHARGE NOTE PROVIDER - ATTENDING DISCHARGE PHYSICAL EXAMINATION:
Patient was seen and examined at bedside on 3/19/2024 at 5 pm with Dr. Brandon present. Patient reports improvement of his initial symptoms, abdominal pain. Denies SOB, CP, N/V. ROS is otherwise negative. Vitals, labwork and pertinent imaging reviewed. Exam - NAD, AAO x 4, PERRLA, EOMI, MMM, supple neck, chest - CTA b/l, CV - rrr, s1s2, no m/r/g, abd - soft, NTND, + BS, ext - wwp,  - + FC, psych - normal affect, back - midline    Plan:  -Patient will be discharged with FC, has close outpatient follow up with Urology  -C/w Tamsulosin  -Patient is medically ready for d/c

## 2024-03-19 NOTE — CONSULT NOTE ADULT - SUBJECTIVE AND OBJECTIVE BOX
HPI:  68M PMH BPH, glaucoma, PSH b/l inguinal hernia repairs (7/2022) presents with reports of minimal urine output since Friday 3/15. Reports only occasional dribbling and has been unable to urinate normally. Also reports decreased appetite and abdominal pain over the last 3 days. Denies recent illness, abx use, changes in diet, strenuous activity. Sees urologist Dr. Devin Arauz, last appt 9/2023 at which time he reported nocturia. Was advised to continue taking his tamsulosin. PSA was checked at that appt and found to be elevated, and prostate was found to be very enlarged on imaging. He was advised to follow up 6 months later but has not seen his urologist since then as he is now out of network. Pt has had urinary retention before, after hernia repairs in 2022 for which lynne was placed, since resolved. Follows with PCP Dr Penny Bah, however has not followed up in 2 years.     ED course:   VS: T 98.6 F oral, HR 66, /67, RR 18, SpO2 96% on RA  Labs significant for: WBCs 10.73, BUN/Cr 57/5.36, eGFR 11  EKG: PENDING  Imaging: none  Interventions: Oxybutynin 5 mg PO  Consults: urology, nephrology   (18 Mar 2024 18:49)      PAST MEDICAL & SURGICAL HISTORY:  BPH without urinary obstruction      Glaucoma      Bilateral inguinal hernia (BIH)      Umbilical hernia      H/O oral surgery      H/O prostate biopsy      History of umbilical hernia repair            Allergies:  No Known Allergies      Home Medications:   dorzolamide-timolol 2.23%-0.68% ophthalmic solution: 1 drop(s) to each affected eye 2 times a day  latanoprost 0.005% ophthalmic solution: 1 drop(s) to each affected eye once a day (in the evening)  tamsulosin 0.4 mg oral capsule: 1 cap(s) orally once a day      Hospital Medications:   MEDICATIONS  (STANDING):  dorzolamide 2%/timolol 0.5% Ophthalmic Solution 1 Drop(s) Both EYES every 12 hours  heparin   Injectable 5000 Unit(s) SubCutaneous every 8 hours  latanoprost 0.005% Ophthalmic Solution 1 Drop(s) Both EYES at bedtime  polyethylene glycol 3350 17 Gram(s) Oral every 24 hours  senna 2 Tablet(s) Oral at bedtime  sodium chloride 0.45%. 500 milliLiter(s) (500 mL/Hr) IV Continuous <Continuous>  sodium chloride 0.45%. 1000 milliLiter(s) (150 mL/Hr) IV Continuous <Continuous>  tamsulosin 0.4 milliGRAM(s) Oral at bedtime      SOCIAL HISTORY:  Denies ETOh, Smoking    Family History:  FAMILY HISTORY:  No pertinent family history in first degree relatives          VITALS:  T(F): 98.2 (03-19-24 @ 06:24), Max: 99.6 (03-18-24 @ 17:01)  HR: 61 (03-19-24 @ 06:24)  BP: 110/64 (03-19-24 @ 06:24)  RR: 18 (03-19-24 @ 06:24)  SpO2: 97% (03-19-24 @ 06:24)  Wt(kg): --    03-18 @ 07:01  -  03-19 @ 07:00  --------------------------------------------------------  IN: 2320 mL / OUT: 3170 mL / NET: -850 mL      Height (cm): 177.8 (03-18 @ 19:15)  Weight (kg): 74.3 (03-18 @ 19:15)  BMI (kg/m2): 23.5 (03-18 @ 19:15)  BSA (m2): 1.92 (03-18 @ 19:15)  CAPILLARY BLOOD GLUCOSE          Review of Systems:  Negative except as mentioned in HPI    PHYSICAL EXAM:  GENERAL: Alert, awake, oriented x3   CHEST/LUNG: Bilateral clear breath sounds  HEART: Regular rate and rhythm, no murmur, no gallops, no rub   ABDOMEN: Soft, nontender, non distended  : No flank or supra pubic tenderness.  EXTREMITIES: no pedal edema  Neurology: AAOx3, no focal neurological deficit      LABS:  03-19    142  |  106  |  47<H>  ----------------------------<  93  4.2   |  27  |  2.76<H>    Ca    9.2      19 Mar 2024 05:30  Phos  4.9     03-19  Mg     2.3     03-19    TPro  7.0  /  Alb  4.1  /  TBili  0.7  /  DBili  0.2  /  AST  36  /  ALT  22  /  AlkPhos  55  03-18    Creatinine Trend: 2.76 <--, 3.06 <--, 3.98 <--, 5.36 <--                        13.4   7.88  )-----------( 199      ( 19 Mar 2024 05:30 )             42.6     Urine Studies:  Urinalysis Basic - ( 19 Mar 2024 05:30 )    Color:  / Appearance:  / SG:  / pH:   Gluc: 93 mg/dL / Ketone:   / Bili:  / Urobili:    Blood:  / Protein:  / Nitrite:    Leuk Esterase:  / RBC:  / WBC    Sq Epi:  / Non Sq Epi:  / Bacteria:                    HPI:  68M PMH BPH, glaucoma, PSH b/l inguinal hernia repairs (7/2022) presents with reports of minimal urine output since Friday 3/15. Reports only occasional dribbling and has been unable to urinate normally. Also reports decreased appetite and abdominal pain over the last 3 days. Denies recent illness, abx use, changes in diet, strenuous activity. Sees urologist Dr. Devin Arauz, last appt 9/2023 at which time he reported nocturia. Was advised to continue taking his tamsulosin. PSA was checked at that appt and found to be elevated, and prostate was found to be very enlarged on imaging. He was advised to follow up 6 months later but has not seen his urologist since then as he is now out of network. Pt has had urinary retention before, after hernia repairs in 2022 for which lagunas was placed, since resolved. Follows with PCP Dr Penny Bah, however has not followed up in 2 years.     ED course:   VS: T 98.6 F oral, HR 66, /67, RR 18, SpO2 96% on RA  Labs significant for: WBCs 10.73, BUN/Cr 57/5.36, eGFR 11  EKG: PENDING  Imaging: none  Interventions: Oxybutynin 5 mg PO  Consults: urology, nephrology   (18 Mar 2024 18:49)    Renal HPI:  68-year-old male with known history of BPH, episode of urinary retention status post hernia surgery 2 years ago required Lagunas catheter for a week, establish follow-up with urology outpatient and was maintained on tamsulosin without any problem, and following up every 6 months.  Patient states about 3 days ago, suddenly he had difficulty urinating, with only some dribbling noted.  Patient states he had a little excessive beer the previous night.  Denied any new medications.  Supposed to follow-up with urology next week.  Baseline kidney function not known.  Last known creatinine EMR 1.01 in 2022.  Denies any nausea, vomiting, chest pain, shortness of breath, abdominal discomfort, edema.  Denies any dizziness lightheadedness, has been able to ambulate to the bathroom independently this morning.    PAST MEDICAL & SURGICAL HISTORY:  BPH without urinary obstruction      Glaucoma      Bilateral inguinal hernia (BIH)      Umbilical hernia      H/O oral surgery      H/O prostate biopsy      History of umbilical hernia repair            Allergies:  No Known Allergies      Home Medications:   dorzolamide-timolol 2.23%-0.68% ophthalmic solution: 1 drop(s) to each affected eye 2 times a day  latanoprost 0.005% ophthalmic solution: 1 drop(s) to each affected eye once a day (in the evening)  tamsulosin 0.4 mg oral capsule: 1 cap(s) orally once a day      Hospital Medications:   MEDICATIONS  (STANDING):  dorzolamide 2%/timolol 0.5% Ophthalmic Solution 1 Drop(s) Both EYES every 12 hours  heparin   Injectable 5000 Unit(s) SubCutaneous every 8 hours  latanoprost 0.005% Ophthalmic Solution 1 Drop(s) Both EYES at bedtime  polyethylene glycol 3350 17 Gram(s) Oral every 24 hours  senna 2 Tablet(s) Oral at bedtime  sodium chloride 0.45%. 500 milliLiter(s) (500 mL/Hr) IV Continuous <Continuous>  sodium chloride 0.45%. 1000 milliLiter(s) (150 mL/Hr) IV Continuous <Continuous>  tamsulosin 0.4 milliGRAM(s) Oral at bedtime      SOCIAL HISTORY:  Denies ETOh, Smoking    Family History:  FAMILY HISTORY:  No pertinent family history in first degree relatives          VITALS:  T(F): 98.2 (03-19-24 @ 06:24), Max: 99.6 (03-18-24 @ 17:01)  HR: 61 (03-19-24 @ 06:24)  BP: 110/64 (03-19-24 @ 06:24)  RR: 18 (03-19-24 @ 06:24)  SpO2: 97% (03-19-24 @ 06:24)  Wt(kg): --    03-18 @ 07:01  -  03-19 @ 07:00  --------------------------------------------------------  IN: 2320 mL / OUT: 3170 mL / NET: -850 mL      Height (cm): 177.8 (03-18 @ 19:15)  Weight (kg): 74.3 (03-18 @ 19:15)  BMI (kg/m2): 23.5 (03-18 @ 19:15)  BSA (m2): 1.92 (03-18 @ 19:15)  CAPILLARY BLOOD GLUCOSE          Review of Systems:  Negative except as mentioned in HPI    PHYSICAL EXAM:  GENERAL: Alert, awake, NAD  CHEST/LUNG: Bilateral clear breath sounds  HEART: S1, S2  ABDOMEN: Soft, nontender, non distended  : Lagunas with clear urine  EXTREMITIES: no edema  Neurology: AAOx3      LABS:  03-19    142  |  106  |  47<H>  ----------------------------<  93  4.2   |  27  |  2.76<H>    Ca    9.2      19 Mar 2024 05:30  Phos  4.9     03-19  Mg     2.3     03-19    TPro  7.0  /  Alb  4.1  /  TBili  0.7  /  DBili  0.2  /  AST  36  /  ALT  22  /  AlkPhos  55  03-18    Creatinine Trend: 2.76 <--, 3.06 <--, 3.98 <--, 5.36 <--                        13.4   7.88  )-----------( 199      ( 19 Mar 2024 05:30 )             42.6     Urine Studies:  Urinalysis Basic - ( 19 Mar 2024 05:30 )    Color:  / Appearance:  / SG:  / pH:   Gluc: 93 mg/dL / Ketone:   / Bili:  / Urobili:    Blood:  / Protein:  / Nitrite:    Leuk Esterase:  / RBC:  / WBC    Sq Epi:  / Non Sq Epi:  / Bacteria:

## 2024-03-19 NOTE — PROGRESS NOTE ADULT - PROBLEM SELECTOR PLAN 6
F: 1/2NS @100cc/hr  E: replete w/ caution i/s/o RAFIA  N: Regular diet  GI: None  DVT: Heparin subq  Dispo: [facility]

## 2024-03-19 NOTE — DISCHARGE NOTE PROVIDER - NSDCCPCAREPLAN_GEN_ALL_CORE_FT
PRINCIPAL DISCHARGE DIAGNOSIS  Diagnosis: Acute urinary retention  Assessment and Plan of Treatment: You presented with decreased urine output for 3 days prior to admission. This was likely due to your pre-existing BPH. A lynne was inserted which significantly improved your symptoms. You will be discharged with a lynne, please follow up with urology outpatient this week for lynne removal. Return to the ED if you develop worsening symptoms or again have difficulty with voiding.      SECONDARY DISCHARGE DIAGNOSES  Diagnosis: BPH (benign prostatic hyperplasia)  Assessment and Plan of Treatment: Benign prostatic hyperplasia, also called "BPH," is a common problem. any men with BPH have no symptoms at all. When symptoms do occur, they can include needing to urinate often, especially at night, having trouble starting to urinate (this means that you might have to wait or strain before urine will come out), having a weak urine stream, leaking or dribbling urine, feeling as though your bladder is not empty even after you urinate. In rare cases, BPH makes it so a man cannot urinate at all. This is a serious problem. If you cannot urinate at all, call your doctor right away.  - Please continue home tamsulosin 0.4 daily      Diagnosis: Lynne catheter in place  Assessment and Plan of Treatment: Lynne Catheter Care, Adult  A Lynne catheter is a soft, flexible tube that is placed into the bladder to drain urine. A Lynne catheter may be inserted if:  You leak urine or are not able to control when you urinate (urinary incontinence).  You are not able to urinate when you need to (urinary retention).   You had prostate surgery or surgery on the genitals.  You have certain medical conditions, such as multiple sclerosis, dementia, or a spinal cord injury.  If you are going home with a Lynne catheter in place, follow the instructions below.  TAKING CARE OF THE CATHETER   Wash your hands with soap and water.   Using mild soap and warm water on a clean washcloth:  Clean the area on your body closest to the catheter insertion site using a circular motion, moving away from the catheter. Never wipe toward the catheter because this could sweep bacteria up into the urethra and cause infection.   Remove all traces of soap. Pat the area dry with a clean towel. For males, reposition the foreskin.    Attach the catheter to your leg so there is no tension on the catheter. Use adhesive tape or a leg strap. If you are using adhesive tape, remove any sticky residue left behind by the previous tape you used.   Keep the drainage bag below the level of the bladder, but keep it off the floor.   Check throughout the day to be sure the catheter is working and urine is draining freely. Make sure the tubing does not become kinked.  Do not pull on the catheter or try to remove it. Pulling could damage internal tissues.  TAKING CARE OF THE DRAINAGE BAGS  You will be given two drainage bags to take home. One is a large overnight drainage bag, and the other is a smaller leg bag that fits underneath clothing. You may wear the overnight bag at any time, but you should never wear the smaller leg bag at night. Follow the instructions below for how to empty, change, and clean your drainage bags.  Emptying the Drainage Bag  You must empty your drainage bag when it is ?–½ full or at least 2–3 times a day.   W     PRINCIPAL DISCHARGE DIAGNOSIS  Diagnosis: Acute urinary retention  Assessment and Plan of Treatment: You presented with decreased urine output for 3 days prior to admission. This was likely due to your pre-existing BPH. A lynne was inserted which significantly improved your symptoms. You will be discharged with a lynne, please follow up with urology outpatient this week for lynne removal. Return to the ED if you develop worsening symptoms or again have difficulty with voiding.      SECONDARY DISCHARGE DIAGNOSES  Diagnosis: ATN (acute tubular necrosis)  Assessment and Plan of Treatment:     Diagnosis: BPH (benign prostatic hyperplasia)  Assessment and Plan of Treatment: Benign prostatic hyperplasia, also called "BPH," is a common problem. any men with BPH have no symptoms at all. When symptoms do occur, they can include needing to urinate often, especially at night, having trouble starting to urinate (this means that you might have to wait or strain before urine will come out), having a weak urine stream, leaking or dribbling urine, feeling as though your bladder is not empty even after you urinate. In rare cases, BPH makes it so a man cannot urinate at all. This is a serious problem. If you cannot urinate at all, call your doctor right away.  - Please continue home tamsulosin 0.4 daily      Diagnosis: Lynne catheter in place  Assessment and Plan of Treatment: Lynne Catheter Care, Adult  A Lynne catheter is a soft, flexible tube that is placed into the bladder to drain urine. A Lynne catheter may be inserted if:  You leak urine or are not able to control when you urinate (urinary incontinence).  You are not able to urinate when you need to (urinary retention).   You had prostate surgery or surgery on the genitals.  You have certain medical conditions, such as multiple sclerosis, dementia, or a spinal cord injury.  If you are going home with a Lynne catheter in place, follow the instructions below.  TAKING CARE OF THE CATHETER   Wash your hands with soap and water.   Using mild soap and warm water on a clean washcloth:  Clean the area on your body closest to the catheter insertion site using a circular motion, moving away from the catheter. Never wipe toward the catheter because this could sweep bacteria up into the urethra and cause infection.   Remove all traces of soap. Pat the area dry with a clean towel. For males, reposition the foreskin.    Attach the catheter to your leg so there is no tension on the catheter. Use adhesive tape or a leg strap. If you are using adhesive tape, remove any sticky residue left behind by the previous tape you used.   Keep the drainage bag below the level of the bladder, but keep it off the floor.   Check throughout the day to be sure the catheter is working and urine is draining freely. Make sure the tubing does not become kinked.  Do not pull on the catheter or try to remove it. Pulling could damage internal tissues.  TAKING CARE OF THE DRAINAGE BAGS  You will be given two drainage bags to take home. One is a large overnight drainage bag, and the other is a smaller leg bag that fits underneath clothing. You may wear the overnight bag at any time, but you should never wear the smaller leg bag at night. Follow the instructions below for how to empty, change, and clean your drainage bags.  Emptying the Drainage Bag  You must empty your drainage bag when it is ?–½ full or at least 2–3 times a day.   W

## 2024-03-20 LAB
CYSTATIN C SERPL-MCNC: 2.48 MG/L — HIGH (ref 0.77–1.42)
GFR/BSA.PRED SERPLBLD CYS-BASED-ARV: 23 ML/MIN/1.73M2 — LOW

## 2024-03-22 ENCOUNTER — APPOINTMENT (OUTPATIENT)
Dept: UROLOGY | Facility: CLINIC | Age: 69
End: 2024-03-22
Payer: MEDICARE

## 2024-03-22 ENCOUNTER — APPOINTMENT (OUTPATIENT)
Dept: UROLOGY | Facility: CLINIC | Age: 69
End: 2024-03-22

## 2024-03-22 VITALS
SYSTOLIC BLOOD PRESSURE: 138 MMHG | WEIGHT: 160 LBS | HEART RATE: 82 BPM | TEMPERATURE: 97.8 F | HEIGHT: 70 IN | BODY MASS INDEX: 22.9 KG/M2 | DIASTOLIC BLOOD PRESSURE: 82 MMHG

## 2024-03-22 PROCEDURE — 99213 OFFICE O/P EST LOW 20 MIN: CPT

## 2024-03-22 NOTE — PHYSICAL EXAM
[Normal Appearance] : normal appearance [Well Groomed] : well groomed [General Appearance - In No Acute Distress] : no acute distress [Respiration, Rhythm And Depth] : normal respiratory rhythm and effort [Exaggerated Use Of Accessory Muscles For Inspiration] : no accessory muscle use [Normal Station and Gait] : the gait and station were normal for the patient's age [] : no rash [Oriented To Time, Place, And Person] : oriented to person, place, and time [No Focal Deficits] : no focal deficits [Affect] : the affect was normal [Mood] : the mood was normal

## 2024-03-25 NOTE — ASSESSMENT
[FreeTextEntry1] : 67yo M urinary retention -increase tamsulosin 0.8mg -patient to DC [place] at home Monday 3/25/24 and F/U in office 3/26

## 2024-03-25 NOTE — END OF VISIT
[FreeTextEntry3] : I, Dr. Liu, personally performed the evaluation and management (E/M) services for this established patient who presents today with (a) new problem(s)/exacerbation of (an) existing condition(s). That E/M includes conducting the clinically appropriate interval history &/or exam, assessing all new/exacerbated conditions, and establishing a new plan of care. Today, my MAGALIE, [employer], was here to observe my evaluation and management service for this new problem/exacerbated condition and follow the plan of care established by me going forward

## 2024-03-25 NOTE — HISTORY OF PRESENT ILLNESS
[FreeTextEntry1] : TRAVIS BRO is a 68 year M presenting on 03/22/2024 PMH: glaucoma, BPH, elevated PSA, hernia surgery Referred by: Regla   tamsulosin 0.4mg  Saturday 3/16/24 difficulty urinating. To [facility] on Monday. Placed catheter, still present. No medication added.   US pelvis 3/19/24: 1.  Normal size kidneys without hydronephrosis. 2.  Markedly enlarged prostate.   50 pack year smoker, 2 beers nightly Family history: unknown  MRI prostate 4/2022 391.3cc PIRADS 1  PSA 12.10, 9/2023 13.8, 3/2023 12.3, 3/2022

## 2024-03-26 ENCOUNTER — LABORATORY RESULT (OUTPATIENT)
Age: 69
End: 2024-03-26

## 2024-03-26 ENCOUNTER — APPOINTMENT (OUTPATIENT)
Dept: UROLOGY | Facility: CLINIC | Age: 69
End: 2024-03-26
Payer: MEDICARE

## 2024-03-26 VITALS
HEIGHT: 70 IN | HEART RATE: 66 BPM | WEIGHT: 160 LBS | TEMPERATURE: 98 F | DIASTOLIC BLOOD PRESSURE: 77 MMHG | SYSTOLIC BLOOD PRESSURE: 130 MMHG | BODY MASS INDEX: 22.9 KG/M2

## 2024-03-26 PROCEDURE — 51702 INSERT TEMP BLADDER CATH: CPT

## 2024-03-26 PROCEDURE — 99214 OFFICE O/P EST MOD 30 MIN: CPT | Mod: 25

## 2024-03-26 NOTE — HISTORY OF PRESENT ILLNESS
[FreeTextEntry1] : TRAVIS BRO is a 68 year M presenting on 03/26/2024 PMH: glaucoma, BPH, elevated PSA, hernia surgery  S/P ER 3/18/24 for urinary retention and Lynne placement Taking tamsulosin 0.4mg Pt dc'd lynne 11pm last night. Still no urination. PVR now >750cc  22Fr coude catheter placed after 3rd attempt. 800cc out.  MRI prostate 4/2022 391.3cc PIRADS 1  PSA 12.10, 9/2023 13.8, 3/2023 12.3, 3/2022

## 2024-03-26 NOTE — ASSESSMENT
[FreeTextEntry1] : 67yo M BPH urinary retention -UA, Ucx -maintain lynne for now -continue tamsulosin 0.8 mg signficnat prostatomegaly fialed voiding trial today reviewed: HoLEP vs PAE vs simple prostatectomy rec HoLEP refer to [facility] for eval

## 2024-03-26 NOTE — END OF VISIT
[FreeTextEntry3] : I, Dr. Liu, personally performed the evaluation and management (E/M) services for this established patient who presents today with (a) new problem(s)/exacerbation of (an) existing condition(s). That E/M includes conducting the clinically appropriate interval history &/or exam, assessing all new/exacerbated conditions, and establishing a new plan of care. Today, my MAGALIE, [employer], was here to observe my evaluation and management service for this new problem/exacerbated condition and follow the plan of care established by me going forward

## 2024-03-27 ENCOUNTER — APPOINTMENT (OUTPATIENT)
Dept: UROLOGY | Facility: CLINIC | Age: 69
End: 2024-03-27
Payer: MEDICARE

## 2024-03-27 VITALS
DIASTOLIC BLOOD PRESSURE: 78 MMHG | SYSTOLIC BLOOD PRESSURE: 119 MMHG | HEART RATE: 93 BPM | WEIGHT: 160 LBS | HEIGHT: 70 IN | TEMPERATURE: 98 F | BODY MASS INDEX: 22.9 KG/M2

## 2024-03-27 LAB
APPEARANCE: CLEAR
BILIRUBIN URINE: NEGATIVE
BLOOD URINE: ABNORMAL
COLOR: YELLOW
GLUCOSE QUALITATIVE U: NEGATIVE MG/DL
KETONES URINE: NEGATIVE MG/DL
LEUKOCYTE ESTERASE URINE: NEGATIVE
NITRITE URINE: NEGATIVE
PH URINE: 6.5
PROTEIN URINE: NORMAL MG/DL
SPECIFIC GRAVITY URINE: 1.01
UROBILINOGEN URINE: 1 MG/DL

## 2024-03-27 PROCEDURE — 99215 OFFICE O/P EST HI 40 MIN: CPT

## 2024-03-27 NOTE — ASSESSMENT
[FreeTextEntry1] : 68 year old man with 390 cc prostate on MRI from 2022, 490 cc prostate on ultrasound from 2024, presents with persistent urinary retention. Management options including chronic lynne catheter, CIC, SPT and prostate debulking procedure discussed. Patient interested in prostate debulking procedure.  I made it clear that because of his large prostate size > 80 grams, AUA recommendation is to perform a simple prostatectomy due to the ability to obtain effective and durable improvement in voiding symptoms. A simple prostatectomy can be performed via an open approach, laparoscopic approach or transurethral laser enucleation approach. We discussed the risks and benefits of each approach. Overall, the long term outcomes are similar. However, the laser enucleation procedure has the least morbidity with quickest recovery of the three options. Therefore, I have recommended laser enucleation of the prostate performed via a transurethral approach.  Mr. BRO decided to proceed with laser enucleation of prostate followed by prostate morcellation. Therefore, I spent a good amount of time discussing the risks and benefits of this procedure. We discussed potential risks of incontinence, retrograde ejaculation and infertility, erectile dysfunction, irritative voiding symptoms, injury to the urethra and bladder, rare need to convert to an open surgery. We also discussed possibility of perineal urethrostomy to reach the bladder and staged procedure if needed.   - OR for HoLEP at [facility] depending on laser availability  - Pre-op clearance  - Pre-op antibiotics

## 2024-03-27 NOTE — PHYSICAL EXAM
[Normal Appearance] : normal appearance [General Appearance - In No Acute Distress] : no acute distress [Well Groomed] : well groomed [Edema] : no peripheral edema [Respiration, Rhythm And Depth] : normal respiratory rhythm and effort [Abdomen Soft] : soft [Exaggerated Use Of Accessory Muscles For Inspiration] : no accessory muscle use [Abdomen Tenderness] : non-tender [Costovertebral Angle Tenderness] : no ~M costovertebral angle tenderness [Urinary Bladder Findings] : the bladder was normal on palpation [Testes Tenderness] : no tenderness of the testes [Testes Mass (___cm)] : there were no testicular masses [] : no rash [No Focal Deficits] : no focal deficits [Normal Station and Gait] : the gait and station were normal for the patient's age [Oriented To Time, Place, And Person] : oriented to person, place, and time [Affect] : the affect was normal [de-identified] : [place] catheter in place with clear urine [Mood] : the mood was normal

## 2024-03-27 NOTE — LETTER BODY
[Dear  ___] : Dear  [unfilled], [Please see my note below.] : Please see my note below. [Courtesy Letter:] : I had the pleasure of seeing your patient, [unfilled], in my office today. [Consult Closing:] : Thank you very much for allowing me to participate in the care of this patient.  If you have any questions, please do not hesitate to contact me. [Sincerely,] : Sincerely, [FreeTextEntry3] : James Linares MD

## 2024-03-27 NOTE — HISTORY OF PRESENT ILLNESS
[FreeTextEntry1] : 68 year old male, referred by Dr. Liu for urinary retention and to consider HoLEP. Prostate was 391 grams on MRI in 4/2022, 490 grams on more recent transabomindal ultrasound.  Prior to catheter placement, had been having increasing LUTS, though not bothersome enough to warrant intervention.   Currently taking flomax, had failed void trial this week.   MRI prostate 4/2022 391.3cc PIRADS 1  PSA 12.10, 9/2023 13.8, 3/2023 12.3, 3/2022

## 2024-03-28 DIAGNOSIS — R33.8 OTHER RETENTION OF URINE: ICD-10-CM

## 2024-03-28 DIAGNOSIS — N40.1 BENIGN PROSTATIC HYPERPLASIA WITH LOWER URINARY TRACT SYMPTOMS: ICD-10-CM

## 2024-03-28 DIAGNOSIS — H40.9 UNSPECIFIED GLAUCOMA: ICD-10-CM

## 2024-03-28 DIAGNOSIS — N17.0 ACUTE KIDNEY FAILURE WITH TUBULAR NECROSIS: ICD-10-CM

## 2024-03-29 LAB
ALBUMIN SERPL ELPH-MCNC: 4 G/DL
ALP BLD-CCNC: 64 U/L
ALT SERPL-CCNC: 22 U/L
ANION GAP SERPL CALC-SCNC: 10 MMOL/L
APTT BLD: 32.4 SEC
AST SERPL-CCNC: 14 U/L
BILIRUB SERPL-MCNC: 0.4 MG/DL
BUN SERPL-MCNC: 13 MG/DL
CALCIUM SERPL-MCNC: 9.3 MG/DL
CHLORIDE SERPL-SCNC: 102 MMOL/L
CO2 SERPL-SCNC: 28 MMOL/L
CREAT SERPL-MCNC: 1.22 MG/DL
EGFR: 65 ML/MIN/1.73M2
GLUCOSE SERPL-MCNC: 129 MG/DL
HCT VFR BLD CALC: 41.7 %
HGB BLD-MCNC: 13.4 G/DL
INR PPP: 1.02 RATIO
MCHC RBC-ENTMCNC: 30.9 PG
MCHC RBC-ENTMCNC: 32.1 GM/DL
MCV RBC AUTO: 96.1 FL
PLATELET # BLD AUTO: 338 K/UL
POTASSIUM SERPL-SCNC: 4.6 MMOL/L
PROT SERPL-MCNC: 6.7 G/DL
PT BLD: 11.5 SEC
RBC # BLD: 4.34 M/UL
RBC # FLD: 11.9 %
SODIUM SERPL-SCNC: 140 MMOL/L
WBC # FLD AUTO: 5.79 K/UL

## 2024-04-01 LAB — BACTERIA UR CULT: NORMAL

## 2024-04-02 ENCOUNTER — APPOINTMENT (OUTPATIENT)
Dept: INTERNAL MEDICINE | Facility: CLINIC | Age: 69
End: 2024-04-02

## 2024-04-03 ENCOUNTER — APPOINTMENT (OUTPATIENT)
Dept: RADIOLOGY | Facility: CLINIC | Age: 69
End: 2024-04-03
Payer: MEDICARE

## 2024-04-03 ENCOUNTER — APPOINTMENT (OUTPATIENT)
Dept: INTERNAL MEDICINE | Facility: CLINIC | Age: 69
End: 2024-04-03
Payer: MEDICARE

## 2024-04-03 ENCOUNTER — NON-APPOINTMENT (OUTPATIENT)
Age: 69
End: 2024-04-03

## 2024-04-03 VITALS
TEMPERATURE: 97.5 F | BODY MASS INDEX: 23.48 KG/M2 | SYSTOLIC BLOOD PRESSURE: 108 MMHG | OXYGEN SATURATION: 98 % | HEART RATE: 75 BPM | WEIGHT: 164 LBS | HEIGHT: 70 IN | DIASTOLIC BLOOD PRESSURE: 70 MMHG

## 2024-04-03 DIAGNOSIS — H40.9 UNSPECIFIED GLAUCOMA: ICD-10-CM

## 2024-04-03 DIAGNOSIS — K42.9 UMBILICAL HERNIA W/OUT OBSTRUCTION OR GANGRENE: ICD-10-CM

## 2024-04-03 DIAGNOSIS — Z87.19 PERSONAL HISTORY OF OTHER DISEASES OF THE DIGESTIVE SYSTEM: ICD-10-CM

## 2024-04-03 DIAGNOSIS — Z87.09 PERSONAL HISTORY OF OTHER DISEASES OF THE RESPIRATORY SYSTEM: ICD-10-CM

## 2024-04-03 DIAGNOSIS — Z01.818 ENCOUNTER FOR OTHER PREPROCEDURAL EXAMINATION: ICD-10-CM

## 2024-04-03 DIAGNOSIS — R97.20 ELEVATED PROSTATE, SPECIFIC ANTIGEN [PSA]: ICD-10-CM

## 2024-04-03 PROCEDURE — G2211 COMPLEX E/M VISIT ADD ON: CPT

## 2024-04-03 PROCEDURE — 36415 COLL VENOUS BLD VENIPUNCTURE: CPT

## 2024-04-03 PROCEDURE — 93000 ELECTROCARDIOGRAM COMPLETE: CPT

## 2024-04-03 PROCEDURE — 99214 OFFICE O/P EST MOD 30 MIN: CPT

## 2024-04-03 PROCEDURE — 71046 X-RAY EXAM CHEST 2 VIEWS: CPT

## 2024-04-03 NOTE — ASSESSMENT
[FreeTextEntry1] : Pt cleared to proceed with intended procedure pending CXR results.  Mo further testing recommended.   Medication Management: Continue current medications   DVT Prophylaxis: as per surgery.

## 2024-04-03 NOTE — HISTORY OF PRESENT ILLNESS
[de-identified] : 68 year old man with 390 cc prostate on MRI from 2022, 490 cc prostate on ultrasound from 2024, presents with persistent urinary retention presents for preoperative clearance prior to laser enucleation of prostate followed by prostate morcellation. He was last seen by me in 2022. Feels well besides the urinary retention. Denies CV symptoms. Daily Alcohol consumption.

## 2024-04-03 NOTE — HEALTH RISK ASSESSMENT
[Good] : ~his/her~  mood as  good [No falls in past year] : Patient reported no falls in the past year [0] : 2) Feeling down, depressed, or hopeless: Not at all (0) [PHQ-2 Negative - No further assessment needed] : PHQ-2 Negative - No further assessment needed [[id number]] : 0 [Fully functional (using the telephone, shopping, preparing meals, housekeeping, doing laundry, using] : Fully functional and needs no help or supervision to perform IADLs (using the telephone, shopping, preparing meals, housekeeping, doing laundry, using transportation, managing medications and managing finances) [Fully functional (bathing, dressing, toileting, transferring, walking, feeding)] : Fully functional (bathing, dressing, toileting, transferring, walking, feeding) [Reports changes in hearing] : Reports no changes in hearing [Reports changes in vision] : Reports no changes in vision [Reports changes in dental health] : Reports no changes in dental health [Smoke Detector] : smoke detector [Safety elements used in home] : safety elements used in home [Former] : Former [20 or more] : 20 or more [> 15 Years] : > 15 Years

## 2024-04-03 NOTE — PHYSICAL EXAM
[No Acute Distress] : no acute distress [Well Developed] : well developed [Well Nourished] : well nourished [Well-Appearing] : well-appearing [Normal Sclera/Conjunctiva] : normal sclera/conjunctiva [PERRL] : pupils equal round and reactive to light [EOMI] : extraocular movements intact [Normal Oropharynx] : the oropharynx was normal [Normal Outer Ear/Nose] : the outer ears and nose were normal in appearance [No JVD] : no jugular venous distention [No Lymphadenopathy] : no lymphadenopathy [Supple] : supple [Thyroid Normal, No Nodules] : the thyroid was normal and there were no nodules present [No Respiratory Distress] : no respiratory distress  [No Accessory Muscle Use] : no accessory muscle use [Clear to Auscultation] : lungs were clear to auscultation bilaterally [Normal Rate] : normal rate  [Regular Rhythm] : with a regular rhythm [Normal S1, S2] : normal S1 and S2 [No Murmur] : no murmur heard [No Carotid Bruits] : no carotid bruits [No Abdominal Bruit] : a ~M bruit was not heard ~T in the abdomen [No Varicosities] : no varicosities [Pedal Pulses Present] : the pedal pulses are present [No Edema] : there was no peripheral edema [No Palpable Aorta] : no palpable aorta [Soft] : abdomen soft [No Extremity Clubbing/Cyanosis] : no extremity clubbing/cyanosis [Non Tender] : non-tender [Non-distended] : non-distended [No Masses] : no abdominal mass palpated [Normal Bowel Sounds] : normal bowel sounds [No HSM] : no HSM [Normal Posterior Cervical Nodes] : no posterior cervical lymphadenopathy [Normal Anterior Cervical Nodes] : no anterior cervical lymphadenopathy [No CVA Tenderness] : no CVA  tenderness [No Spinal Tenderness] : no spinal tenderness [Grossly Normal Strength/Tone] : grossly normal strength/tone [No Joint Swelling] : no joint swelling [No Rash] : no rash [Coordination Grossly Intact] : coordination grossly intact [No Focal Deficits] : no focal deficits [Normal Gait] : normal gait [Deep Tendon Reflexes (DTR)] : deep tendon reflexes were 2+ and symmetric [Normal Affect] : the affect was normal [Normal Insight/Judgement] : insight and judgment were intact

## 2024-04-04 LAB
ESTIMATED AVERAGE GLUCOSE: 108 MG/DL
HBA1C MFR BLD HPLC: 5.4 %
PSA SERPL-MCNC: 23.9 NG/ML

## 2024-04-19 ENCOUNTER — APPOINTMENT (OUTPATIENT)
Dept: UROLOGY | Facility: CLINIC | Age: 69
End: 2024-04-19
Payer: MEDICARE

## 2024-04-19 VITALS
HEIGHT: 70 IN | OXYGEN SATURATION: 99 % | HEART RATE: 76 BPM | TEMPERATURE: 98 F | SYSTOLIC BLOOD PRESSURE: 111 MMHG | BODY MASS INDEX: 23.48 KG/M2 | WEIGHT: 164 LBS | DIASTOLIC BLOOD PRESSURE: 72 MMHG

## 2024-04-19 PROCEDURE — 99213 OFFICE O/P EST LOW 20 MIN: CPT

## 2024-04-19 NOTE — ASSESSMENT
[FreeTextEntry1] : 68 year old man with 390 cc prostate on MRI from 2022, 490 cc prostate on ultrasound from 2024, urinary retention scheduled for HoLEP on 4/26/24.   - F/U urine culture  - Pre-op antibiotics.

## 2024-04-19 NOTE — PHYSICAL EXAM
[Normal Appearance] : normal appearance [Well Groomed] : well groomed [General Appearance - In No Acute Distress] : no acute distress [Edema] : no peripheral edema [Respiration, Rhythm And Depth] : normal respiratory rhythm and effort [Exaggerated Use Of Accessory Muscles For Inspiration] : no accessory muscle use [Abdomen Soft] : soft [Abdomen Tenderness] : non-tender [Costovertebral Angle Tenderness] : no ~M costovertebral angle tenderness [Urinary Bladder Findings] : the bladder was normal on palpation [Testes Tenderness] : no tenderness of the testes [Testes Mass (___cm)] : there were no testicular masses [Normal Station and Gait] : the gait and station were normal for the patient's age [] : no rash [No Focal Deficits] : no focal deficits [Oriented To Time, Place, And Person] : oriented to person, place, and time [Affect] : the affect was normal [Mood] : the mood was normal [de-identified] : [place] catheter in place with clear urine

## 2024-04-19 NOTE — LETTER BODY
[Dear  ___] : Dear  [unfilled], [Courtesy Letter:] : I had the pleasure of seeing your patient, [unfilled], in my office today. [Please see my note below.] : Please see my note below. [Consult Closing:] : Thank you very much for allowing me to participate in the care of this patient.  If you have any questions, please do not hesitate to contact me. [Sincerely,] : Sincerely, [FreeTextEntry3] : James Linares MD

## 2024-04-22 LAB — BACTERIA UR CULT: NORMAL

## 2024-04-25 NOTE — ASU PATIENT PROFILE, ADULT - NS PREOP UNDERSTANDS INFO
NPO after midnight solids water till 4AM escort required for discharge home, bring picture ID and insurance, wear comfortable clothes

## 2024-04-26 ENCOUNTER — TRANSCRIPTION ENCOUNTER (OUTPATIENT)
Age: 69
End: 2024-04-26

## 2024-04-26 ENCOUNTER — OUTPATIENT (OUTPATIENT)
Dept: OUTPATIENT SERVICES | Facility: HOSPITAL | Age: 69
LOS: 1 days | Discharge: HOME | End: 2024-04-26
Payer: MEDICARE

## 2024-04-26 ENCOUNTER — RESULT REVIEW (OUTPATIENT)
Age: 69
End: 2024-04-26

## 2024-04-26 ENCOUNTER — APPOINTMENT (OUTPATIENT)
Dept: UROLOGY | Facility: AMBULATORY SURGERY CENTER | Age: 69
End: 2024-04-26

## 2024-04-26 VITALS
TEMPERATURE: 97 F | RESPIRATION RATE: 16 BRPM | OXYGEN SATURATION: 97 % | DIASTOLIC BLOOD PRESSURE: 72 MMHG | SYSTOLIC BLOOD PRESSURE: 134 MMHG | HEART RATE: 76 BPM

## 2024-04-26 VITALS
HEIGHT: 70 IN | OXYGEN SATURATION: 98 % | HEART RATE: 63 BPM | RESPIRATION RATE: 14 BRPM | TEMPERATURE: 98 F | DIASTOLIC BLOOD PRESSURE: 66 MMHG | SYSTOLIC BLOOD PRESSURE: 126 MMHG | WEIGHT: 160.72 LBS

## 2024-04-26 DIAGNOSIS — Z98.890 OTHER SPECIFIED POSTPROCEDURAL STATES: Chronic | ICD-10-CM

## 2024-04-26 LAB
ISTAT VENOUS BE: -2 MMOL/L — SIGNIFICANT CHANGE UP (ref -2–3)
ISTAT VENOUS GLUCOSE: 148 MG/DL — HIGH (ref 70–99)
ISTAT VENOUS HCO3: 26 MMOL/L — SIGNIFICANT CHANGE UP (ref 23–28)
ISTAT VENOUS HEMATOCRIT: 42 % — SIGNIFICANT CHANGE UP (ref 39–50)
ISTAT VENOUS HEMOGLOBIN: 14.3 GM/DL — SIGNIFICANT CHANGE UP (ref 13–17)
ISTAT VENOUS IONIZED CALCIUM: 1.2 MMOL/L — SIGNIFICANT CHANGE UP (ref 1.12–1.3)
ISTAT VENOUS PCO2: 60 MMHG — HIGH (ref 41–51)
ISTAT VENOUS PH: 7.25 — LOW (ref 7.31–7.41)
ISTAT VENOUS PO2: <66 MMHG — LOW (ref 35–40)
ISTAT VENOUS POTASSIUM: 4.8 MMOL/L — SIGNIFICANT CHANGE UP (ref 3.5–5.3)
ISTAT VENOUS SO2: 34 % — SIGNIFICANT CHANGE UP
ISTAT VENOUS SODIUM: 142 MMOL/L — SIGNIFICANT CHANGE UP (ref 135–145)
ISTAT VENOUS TCO2: 28 MMOL/L — SIGNIFICANT CHANGE UP (ref 22–31)

## 2024-04-26 PROCEDURE — 88305 TISSUE EXAM BY PATHOLOGIST: CPT | Mod: 26

## 2024-04-26 PROCEDURE — 52649 PROSTATE LASER ENUCLEATION: CPT | Mod: 22

## 2024-04-26 DEVICE — LASER FIBER MOSES 550 D/F/L: Type: IMPLANTABLE DEVICE | Status: FUNCTIONAL

## 2024-04-26 DEVICE — MOCELLATOR ROTATION SINGLEUSE 4.75: Type: IMPLANTABLE DEVICE | Status: FUNCTIONAL

## 2024-04-26 RX ORDER — APREPITANT 80 MG/1
40 CAPSULE ORAL ONCE
Refills: 0 | Status: COMPLETED | OUTPATIENT
Start: 2024-04-26 | End: 2024-04-26

## 2024-04-26 RX ORDER — ONDANSETRON 8 MG/1
4 TABLET, FILM COATED ORAL ONCE
Refills: 0 | Status: DISCONTINUED | OUTPATIENT
Start: 2024-04-26 | End: 2024-04-26

## 2024-04-26 RX ORDER — OXYCODONE HYDROCHLORIDE 5 MG/1
5 TABLET ORAL ONCE
Refills: 0 | Status: DISCONTINUED | OUTPATIENT
Start: 2024-04-26 | End: 2024-04-26

## 2024-04-26 RX ORDER — CIPROFLOXACIN LACTATE 400MG/40ML
1 VIAL (ML) INTRAVENOUS
Qty: 8 | Refills: 0
Start: 2024-04-26 | End: 2024-04-29

## 2024-04-26 RX ORDER — DORZOLAMIDE HYDROCHLORIDE TIMOLOL MALEATE 20; 5 MG/ML; MG/ML
1 SOLUTION/ DROPS OPHTHALMIC
Qty: 0 | Refills: 0 | DISCHARGE

## 2024-04-26 RX ORDER — FENTANYL CITRATE 50 UG/ML
50 INJECTION INTRAVENOUS ONCE
Refills: 0 | Status: DISCONTINUED | OUTPATIENT
Start: 2024-04-26 | End: 2024-04-26

## 2024-04-26 RX ORDER — LATANOPROST 0.05 MG/ML
1 SOLUTION/ DROPS OPHTHALMIC; TOPICAL
Qty: 0 | Refills: 0 | DISCHARGE

## 2024-04-26 RX ORDER — ACETAMINOPHEN 500 MG
1000 TABLET ORAL ONCE
Refills: 0 | Status: COMPLETED | OUTPATIENT
Start: 2024-04-26 | End: 2024-04-26

## 2024-04-26 RX ORDER — LIDOCAINE HCL 20 MG/ML
10 VIAL (ML) INJECTION ONCE
Refills: 0 | Status: DISCONTINUED | OUTPATIENT
Start: 2024-04-26 | End: 2024-04-26

## 2024-04-26 RX ORDER — TAMSULOSIN HYDROCHLORIDE 0.4 MG/1
1 CAPSULE ORAL
Qty: 0 | Refills: 0 | DISCHARGE

## 2024-04-26 RX ORDER — SODIUM CHLORIDE 9 MG/ML
1000 INJECTION, SOLUTION INTRAVENOUS
Refills: 0 | Status: DISCONTINUED | OUTPATIENT
Start: 2024-04-26 | End: 2024-04-26

## 2024-04-26 RX ADMIN — APREPITANT 40 MILLIGRAM(S): 80 CAPSULE ORAL at 07:22

## 2024-04-26 RX ADMIN — Medication 1000 MILLIGRAM(S): at 07:22

## 2024-04-26 NOTE — ASU DISCHARGE PLAN (ADULT/PEDIATRIC) - CARE PROVIDER_API CALL
James Linares  Urology  [address]  [place] [address]  Phone: [phone]  Fax: [phone]  Follow Up Time: 1-3 days

## 2024-04-26 NOTE — ASU DISCHARGE PLAN (ADULT/PEDIATRIC) - A. DRIVE A CAR, OPERATE POWER TOOLS OR MACHINERY
Viable male infant delivered via C/S. Vacuum x1 pull. No pop offs. AGA. Mother GDIC- will check blood sugars per algorithm. Mother went under general anesthesia for C/S. Will start with human donor milk per patient and fathers request.    Statement Selected

## 2024-04-26 NOTE — ASU DISCHARGE PLAN (ADULT/PEDIATRIC) - NS MD DC FALL RISK RISK
For information on Fall & Injury Prevention, visit: [email or web address] OR  [email or web address] OR  https://www.cdc.gov/steadi/patient.html

## 2024-04-26 NOTE — ASU DISCHARGE PLAN (ADULT/PEDIATRIC) - ACCOMPANIED BY
2nd surveillance program enrollment attempt.  No answer.  Left VM message including OOC number. Pt enrolled in [facility] program.  MyChart message previously sent.  Tasks remain in place.    Reason for Disposition   Message left on identified voicemail    Additional Information   Negative: Caller has already spoken with the PCP (or office), and has no further questions   Negative: Caller has already spoken with another triager and has no further questions   Negative: Caller has already spoken with another triager or PCP (or office), and has further questions and triager able to answer questions.   Negative: Busy signal.  First attempt to contact caller.  Follow-up call scheduled within 15 minutes.   Negative: No answer.  First attempt to contact caller.  Follow-up call scheduled within 15 minutes.    Protocols used: NO CONTACT OR DUPLICATE CONTACT CALL-A-[place]       Family

## 2024-04-26 NOTE — ASU DISCHARGE PLAN (ADULT/PEDIATRIC) - ASU DC SPECIAL INSTRUCTIONSFT
Enucleation of prostate   Please follow up with Dr. Linares's office on this upcoming Monday for CHARLI.   Please complete your three days of ciprofloxacin antibioitic as perscribed that was sent to your preferred pharmacy.   GENERAL: It is common to have blood in your urine after your procedure.  It may be pink or even red; and it is important to increase fluid intake to 2-3L of water per day to keep the urine as clear as possible. Please inform your doctor if you have a significant amount of clot in the urine or if you are unable to void at all.  The urine may clear and then become bloody again especially as you are more physically active. It is not uncommon to have some burning when you urinate, this will gradually improve. With a catheter in place, it is not uncommon to have occasional leakage or urine or blood around the catheter. Please call your urologist if this is excessive and/or the urine is not draining through the catheter into the bag.    CATHETER: Most patients are sent home with a Lynne catheter, which continuously drains the urine from the bladder. If you still have a catheter, the nurses will review instructions and care before you go home. For men, you may have a prescription for lidocaine jelly to apply to the tip of your penis, as needed, for catheter related discomfort.      PAIN: You may take Tylenol (acetaminophen) 650-975mg and/or Motrin (ibuprofen) 400-600mg, both available over the counter, for pain every 6 hours as needed. Do not exceed 4000mg of Tylenol (acetaminophen) daily. You may alternate these medications such that you take one or the other every 3 hours for around the clock pain coverage.    STOOL SOFTENERS: Do not allow yourself to become constipated as straining may cause bleeding. Take stool softeners or a laxative (ex. Miralax, Colace, Senokot, ExLax, etc), available over the counter, if needed.    ANTICOAGULATION: If you are taking any blood thinning medications, please discuss with your urologist prior to restarting these medications unless otherwise specified.    BATHING: You may shower or bathe. If going home with lynne, shower only until catheter is removed.    DIET: You may resume your regular diet and regular medication regimen.    ACTIVITY: No heavy lifting or strenuous exercise until you are evaluated at your post-operative appointment. Otherwise, you may return to your usual level of physical activity.    FOLLOW-UP: If you did not already schedule your post-operative appointment, please call your urologist to schedule and follow-up appointment.    CALL YOUR UROLOGIST IF: You have any bleeding that does not stop, inability to void >8 hours, fever over 100.4 F, chills, persistent nausea/vomiting, changes in your incision concerning for infection, or if your pain is not controlled on your discharge pain medications.

## 2024-04-29 ENCOUNTER — APPOINTMENT (OUTPATIENT)
Dept: UROLOGY | Facility: CLINIC | Age: 69
End: 2024-04-29
Payer: MEDICARE

## 2024-04-29 VITALS
DIASTOLIC BLOOD PRESSURE: 79 MMHG | WEIGHT: 164 LBS | HEART RATE: 73 BPM | TEMPERATURE: 97.7 F | SYSTOLIC BLOOD PRESSURE: 123 MMHG | OXYGEN SATURATION: 99 % | BODY MASS INDEX: 23.48 KG/M2 | HEIGHT: 70 IN

## 2024-04-29 LAB
CULTURE RESULTS: SIGNIFICANT CHANGE UP
SPECIMEN SOURCE: SIGNIFICANT CHANGE UP

## 2024-04-29 PROCEDURE — 51700 IRRIGATION OF BLADDER: CPT

## 2024-04-29 PROCEDURE — A4216: CPT | Mod: NC

## 2024-04-29 NOTE — ASSESSMENT
[FreeTextEntry1] : 68 year old man with 390 cc prostate on MRI from 2022, 490 cc prostate on ultrasound from 2024, urinary retention scheduled for HoLEP. Here or TOV s/p HoLEP at [facility] on 4/26/24. Passed TOV today on POD-3.   Discussed post-op instructions and expectations including hematuria, incontinence, Kegel exercises and when to call the office. Post-op instruction worksheet given. RTC 2 weeks.   - RTC 2 weeks with Dr. Linares

## 2024-04-29 NOTE — HISTORY OF PRESENT ILLNESS
[FreeTextEntry1] : 3/27/24: 68 year old male, referred by Dr. Liu for urinary retention and to consider HoLEP. Prostate was 391 grams on MRI in 4/2022, 490 grams on more recent transabomindal ultrasound.  Prior to catheter placement, had been having increasing LUTS, though not bothersome enough to warrant intervention.  Currently taking flomax, had failed void trial this week.  MRI prostate 4/2022 391.3cc PIRADS 1  PSA 12.10, 9/2023 13.8, 3/2023 12.3, 3/2022  4/19/24: Returns for pre-op appointment. Surgery scheduled 4/26/24. Urine sample obtained from catheter. Patient preferred not to change catheter today understanding slightly higher risk of infection.  4/29/24: Here for TOV s/p HoLEP at [facility] on 4/26/24. Doing well, urine draining clear yellow. Had some discomfort over the weekend but overall feeling well.    TOV: - 300 cc sterile water instilled via existing catheter - Catheter removed in its entirety - Patient voided 250 cc pink urine easily

## 2024-05-06 LAB — SURGICAL PATHOLOGY STUDY: SIGNIFICANT CHANGE UP

## 2024-05-15 ENCOUNTER — APPOINTMENT (OUTPATIENT)
Dept: UROLOGY | Facility: CLINIC | Age: 69
End: 2024-05-15
Payer: MEDICARE

## 2024-05-15 VITALS
DIASTOLIC BLOOD PRESSURE: 75 MMHG | HEART RATE: 88 BPM | WEIGHT: 164 LBS | BODY MASS INDEX: 23.48 KG/M2 | HEIGHT: 70 IN | TEMPERATURE: 97.6 F | OXYGEN SATURATION: 98 % | SYSTOLIC BLOOD PRESSURE: 114 MMHG

## 2024-05-15 DIAGNOSIS — R33.9 RETENTION OF URINE, UNSPECIFIED: ICD-10-CM

## 2024-05-15 DIAGNOSIS — N40.0 BENIGN PROSTATIC HYPERPLASIA WITHOUT LOWER URINARY TRACT SYMPMS: ICD-10-CM

## 2024-05-15 DIAGNOSIS — R30.0 DYSURIA: ICD-10-CM

## 2024-05-15 PROCEDURE — 99024 POSTOP FOLLOW-UP VISIT: CPT

## 2024-05-15 PROCEDURE — 81003 URINALYSIS AUTO W/O SCOPE: CPT | Mod: QW

## 2024-05-15 RX ORDER — TAMSULOSIN HYDROCHLORIDE 0.4 MG/1
0.4 CAPSULE ORAL
Qty: 60 | Refills: 2 | Status: COMPLETED | COMMUNITY
Start: 2022-05-31 | End: 2024-05-15

## 2024-05-15 NOTE — ASSESSMENT
[FreeTextEntry1] : 69 year old man with 390 cc prostate on MRI from 2022, 490 cc prostate on ultrasound from 2024, urinary retention scheduled for HoLEP. Here or TOV s/p HoLEP at [facility] on 4/26/24. Passed TOV today on POD-3. Here for f/u, urinating with weak stream, moderate leakage using x2 pads/day. No hematuria or dysuria.  - Kegels - RTC 3 months (IPSS, UA, PVR, PSA)

## 2024-05-15 NOTE — HISTORY OF PRESENT ILLNESS
[FreeTextEntry1] : 3/27/24: 68 year old male, referred by Dr. Liu for urinary retention and to consider HoLEP. Prostate was 391 grams on MRI in 4/2022, 490 grams on more recent transabomindal ultrasound.  Prior to catheter placement, had been having increasing LUTS, though not bothersome enough to warrant intervention.  Currently taking flomax, had failed void trial this week.  MRI prostate 4/2022 391.3cc PIRADS 1  PSA 12.10, 9/2023 13.8, 3/2023 12.3, 3/2022  4/19/24: Returns for pre-op appointment. Surgery scheduled 4/26/24. Urine sample obtained from catheter. Patient preferred not to change catheter today understanding slightly higher risk of infection.  4/29/24: Here for TOV s/p HoLEP at [facility] on 4/26/24. Doing well, urine draining clear yellow. Had some discomfort over the weekend but overall feeling well.  TOV: - 300 cc sterile water instilled via existing catheter - Catheter removed in its entirety - Patient voided 250 cc pink urine easily  5/15/24: Here for f/u, s/p HoLEP on 4/26/24. He is urinating with a weaker stream, complete emptying, no hematuria, moderate incontinence using pads x2/day. He is leaking more at night.   PVR: 4 ml

## 2024-05-16 LAB
APPEARANCE: ABNORMAL
BACTERIA: ABNORMAL /HPF
BILIRUB UR QL STRIP: NEGATIVE
BILIRUBIN URINE: NEGATIVE
BLOOD URINE: ABNORMAL
CAST: 1 /LPF
CLARITY UR: CLEAR
COLLECTION METHOD: NORMAL
COLOR: NORMAL
EPITHELIAL CELLS: 0 /HPF
GLUCOSE QUALITATIVE U: NEGATIVE MG/DL
GLUCOSE UR-MCNC: NEGATIVE
HCG UR QL: 1 EU/DL
HGB UR QL STRIP.AUTO: NORMAL
KETONES UR-MCNC: NEGATIVE
KETONES URINE: NEGATIVE MG/DL
LEUKOCYTE ESTERASE UR QL STRIP: NORMAL
LEUKOCYTE ESTERASE URINE: ABNORMAL
MICROSCOPIC-UA: NORMAL
NITRITE UR QL STRIP: NEGATIVE
NITRITE URINE: NEGATIVE
PH UR STRIP: 7
PH URINE: 6.5
PROT UR STRIP-MCNC: NORMAL
PROTEIN URINE: 100 MG/DL
RED BLOOD CELLS URINE: 212 /HPF
SP GR UR STRIP: 1.02
SPECIFIC GRAVITY URINE: 1.01
UROBILINOGEN URINE: 0.2 MG/DL
WHITE BLOOD CELLS URINE: 509 /HPF

## 2024-05-17 LAB — BACTERIA UR CULT: NORMAL

## 2024-08-13 ENCOUNTER — APPOINTMENT (OUTPATIENT)
Dept: UROLOGY | Facility: CLINIC | Age: 69
End: 2024-08-13
Payer: MEDICARE

## 2024-08-13 DIAGNOSIS — R33.9 RETENTION OF URINE, UNSPECIFIED: ICD-10-CM

## 2024-08-13 DIAGNOSIS — N40.0 BENIGN PROSTATIC HYPERPLASIA WITHOUT LOWER URINARY TRACT SYMPMS: ICD-10-CM

## 2024-08-13 DIAGNOSIS — R97.20 ELEVATED PROSTATE, SPECIFIC ANTIGEN [PSA]: ICD-10-CM

## 2024-08-13 PROCEDURE — 99214 OFFICE O/P EST MOD 30 MIN: CPT | Mod: 25

## 2024-08-13 PROCEDURE — 51798 US URINE CAPACITY MEASURE: CPT

## 2024-08-13 PROCEDURE — G2211 COMPLEX E/M VISIT ADD ON: CPT

## 2024-08-13 NOTE — HISTORY OF PRESENT ILLNESS
[FreeTextEntry1] : 3/27/24: 68 year old male, referred by Dr. Liu for urinary retention and to consider HoLEP. Prostate was 391 grams on MRI in 4/2022, 490 grams on more recent transabomindal ultrasound.  Prior to catheter placement, had been having increasing LUTS, though not bothersome enough to warrant intervention.  Currently taking flomax, had failed void trial this week.  MRI prostate 4/2022 391.3cc PIRADS 1  PSA 12.10, 9/2023 13.8, 3/2023 12.3, 3/2022  4/19/24: Returns for pre-op appointment. Surgery scheduled 4/26/24. Urine sample obtained from catheter. Patient preferred not to change catheter today understanding slightly higher risk of infection.  4/29/24: Here for TOV s/p HoLEP at [facility] on 4/26/24. Doing well, urine draining clear yellow. Had some discomfort over the weekend but overall feeling well.  TOV: - 300 cc sterile water instilled via existing catheter - Catheter removed in its entirety - Patient voided 250 cc pink urine easily  5/15/24: Here for f/u, s/p HoLEP on 4/26/24. He is urinating with a weaker stream, complete emptying, no hematuria, moderate incontinence using pads x2/day. He is leaking more at night.   PVR: 4 ml   8/13/24: Doing well, strong stream, complete emptying, no leakage, no hematuria, no dysuria.  PVR 0 cc

## 2024-08-13 NOTE — ASSESSMENT
[FreeTextEntry1] : 69 year old man with 390 cc prostate on MRI from 2022, 490 cc prostate on ultrasound from 2024, urinary retention scheduled for HoLEP. Here or TOV s/p HoLEP at [facility] on 4/26/24. Passed TOV today on POD-3. Here for f/u, doing well with strong stream, complete emptying, no leakage, no hematuria.   - F/U PSA  - F/U in 12 months for UA, IPSS, PVR, PSA

## 2024-08-15 LAB
APPEARANCE: ABNORMAL
BACTERIA UR CULT: NORMAL
BACTERIA: NEGATIVE /HPF
BILIRUBIN URINE: NEGATIVE
BLOOD URINE: ABNORMAL
CAST: 0 /LPF
COLOR: YELLOW
EPITHELIAL CELLS: 0 /HPF
GLUCOSE QUALITATIVE U: NEGATIVE MG/DL
KETONES URINE: NEGATIVE MG/DL
LEUKOCYTE ESTERASE URINE: ABNORMAL
MICROSCOPIC-UA: NORMAL
NITRITE URINE: NEGATIVE
PH URINE: 6
PROTEIN URINE: 30 MG/DL
PSA SERPL-MCNC: 0.36 NG/ML
RED BLOOD CELLS URINE: 12 /HPF
SPECIFIC GRAVITY URINE: 1.02
UROBILINOGEN URINE: 0.2 MG/DL
WHITE BLOOD CELLS URINE: 360 /HPF

## 2024-09-03 ENCOUNTER — RX RENEWAL (OUTPATIENT)
Age: 69
End: 2024-09-03

## 2025-08-12 ENCOUNTER — APPOINTMENT (OUTPATIENT)
Dept: UROLOGY | Facility: CLINIC | Age: 70
End: 2025-08-12
Payer: MEDICARE

## 2025-08-12 ENCOUNTER — NON-APPOINTMENT (OUTPATIENT)
Age: 70
End: 2025-08-12

## 2025-08-12 VITALS
TEMPERATURE: 97.7 F | DIASTOLIC BLOOD PRESSURE: 71 MMHG | HEIGHT: 70 IN | BODY MASS INDEX: 25.48 KG/M2 | HEART RATE: 80 BPM | WEIGHT: 178 LBS | SYSTOLIC BLOOD PRESSURE: 116 MMHG

## 2025-08-12 DIAGNOSIS — R30.0 DYSURIA: ICD-10-CM

## 2025-08-12 DIAGNOSIS — N40.0 BENIGN PROSTATIC HYPERPLASIA WITHOUT LOWER URINARY TRACT SYMPMS: ICD-10-CM

## 2025-08-12 PROCEDURE — 99214 OFFICE O/P EST MOD 30 MIN: CPT | Mod: 25

## 2025-08-12 PROCEDURE — 51798 US URINE CAPACITY MEASURE: CPT

## 2025-08-13 LAB
APPEARANCE: CLEAR
BACTERIA: NEGATIVE /HPF
BILIRUBIN URINE: NEGATIVE
BLOOD URINE: NEGATIVE
CAST: 0 /LPF
COLOR: YELLOW
EPITHELIAL CELLS: 0 /HPF
GLUCOSE QUALITATIVE U: NEGATIVE MG/DL
KETONES URINE: NEGATIVE MG/DL
LEUKOCYTE ESTERASE URINE: NEGATIVE
MICROSCOPIC-UA: NORMAL
NITRITE URINE: NEGATIVE
PH URINE: 6.5
PROTEIN URINE: NEGATIVE MG/DL
PSA SERPL-MCNC: 0.57 NG/ML
RED BLOOD CELLS URINE: 0 /HPF
SPECIFIC GRAVITY URINE: 1.01
UROBILINOGEN URINE: 1 MG/DL
WHITE BLOOD CELLS URINE: 0 /HPF

## 2025-08-14 LAB — BACTERIA UR CULT: NORMAL

## (undated) DEVICE — SLV COMPRESSION KNEE MED

## (undated) DEVICE — CONTAINER TISSUE COLLECTING DISP

## (undated) DEVICE — XI SEAL UNIV 5- 8 MM

## (undated) DEVICE — XI OBTURATOR OPTICAL BLADELESS 8MM

## (undated) DEVICE — TUBING LEVEL ONE NORMOFLO SET

## (undated) DEVICE — PACK CYSTO

## (undated) DEVICE — SUT VICRYL 2-0 27" SH

## (undated) DEVICE — SUT VICRYL 0 27" UR-6

## (undated) DEVICE — TAPE SILK 3"

## (undated) DEVICE — SUT MAXON 0 30" GS-11

## (undated) DEVICE — SUT VICRYL 2-0 27" UR-6

## (undated) DEVICE — ELCTR GROUNDING PAD ADULT COVIDIEN

## (undated) DEVICE — DRSG SUPPORTER ADULT 3" WAISTBAND LRG

## (undated) DEVICE — UROVAC

## (undated) DEVICE — DRAINAGE BAG URINARY 4L

## (undated) DEVICE — TROCAR COVIDIEN VERSAONE BLUNT TIP HASSAN 12MM

## (undated) DEVICE — FOLEY CATH 3-WAY 22FR 30CC LATEX HEMATURIA COUDE

## (undated) DEVICE — ELCTR BOVIE PENCIL HANDPIECE ROCKER SWITCH 15FT

## (undated) DEVICE — TUBING TUR 2 PRONG

## (undated) DEVICE — SUPP ATHLETIC MALE XLG 44-55IN

## (undated) DEVICE — WARMING BLANKET LOWER ADULT

## (undated) DEVICE — GOWN ROYAL SILK XL

## (undated) DEVICE — MARKING PEN W RULER

## (undated) DEVICE — D HELP - CLEARVIEW CLEARIFY SYSTEM

## (undated) DEVICE — GLV 7.5 PROTEXIS (WHITE)

## (undated) DEVICE — GLV 6.5 PROTEXIS (WHITE)

## (undated) DEVICE — DRAPE TOP SHEET 53" X 101"

## (undated) DEVICE — TUBING SET FOR SUCTION PUMP

## (undated) DEVICE — MEDELA OVERFLOW FILTER DISPOSABLE

## (undated) DEVICE — SOL IRR BAG NS 0.9% 3000ML

## (undated) DEVICE — XI DRAPE ARM

## (undated) DEVICE — SUT PDO 0 1/2 CIRCLE 22MM NDL 20CM

## (undated) DEVICE — XI ARM FORCEP FENESTRATED BIPOLAR 8MM

## (undated) DEVICE — ELCTR CUTTING 24/26FR

## (undated) DEVICE — GLV 7 PROTEXIS (WHITE)

## (undated) DEVICE — DRSG SUPPORTER ADULT 3" WAISTBAND MED

## (undated) DEVICE — XI DRAPE COLUMN

## (undated) DEVICE — ELCTR BIVAP BIPOLAR VAPORIZATION 26FR

## (undated) DEVICE — SUT MONOCRYL 4-0 27" PS-2 UNDYED